# Patient Record
Sex: FEMALE | Race: WHITE | Employment: FULL TIME | URBAN - METROPOLITAN AREA
[De-identification: names, ages, dates, MRNs, and addresses within clinical notes are randomized per-mention and may not be internally consistent; named-entity substitution may affect disease eponyms.]

---

## 2018-06-27 PROBLEM — Z97.5 IUD (INTRAUTERINE DEVICE) IN PLACE: Status: ACTIVE | Noted: 2018-06-27

## 2018-06-27 PROBLEM — R87.612 LOW GRADE SQUAMOUS INTRAEPITHELIAL LESION ON CYTOLOGIC SMEAR OF CERVIX (LGSIL): Status: ACTIVE | Noted: 2018-06-27

## 2021-04-01 LAB
HBSAG, EXTERNAL: NEGATIVE
HEPATITIS C AB,   EXT: NEGATIVE
HIV, EXTERNAL: NONREACTIVE
RPR, EXTERNAL: NONREACTIVE
RUBELLA, EXTERNAL: NORMAL

## 2021-04-29 LAB
CHLAMYDIA, EXTERNAL: NEGATIVE
N. GONORRHEA, EXTERNAL: NEGATIVE

## 2021-10-15 LAB — GRBS, EXTERNAL: NEGATIVE

## 2021-11-03 ENCOUNTER — HOSPITAL ENCOUNTER (INPATIENT)
Age: 31
LOS: 4 days | Discharge: HOME | End: 2021-11-07
Attending: OBSTETRICS & GYNECOLOGY | Admitting: OBSTETRICS & GYNECOLOGY
Payer: COMMERCIAL

## 2021-11-03 PROBLEM — Z37.9 NORMAL LABOR: Status: ACTIVE | Noted: 2021-11-03

## 2021-11-03 LAB
ABO + RH BLD: NORMAL
BLOOD GROUP ANTIBODIES SERPL: NORMAL
ERYTHROCYTE [DISTWIDTH] IN BLOOD BY AUTOMATED COUNT: 13.6 % (ref 11.9–14.6)
HCT VFR BLD AUTO: 35.4 % (ref 35.8–46.3)
HGB BLD-MCNC: 12 G/DL (ref 11.7–15.4)
MCH RBC QN AUTO: 29.1 PG (ref 26.1–32.9)
MCHC RBC AUTO-ENTMCNC: 33.9 G/DL (ref 31.4–35)
MCV RBC AUTO: 85.7 FL (ref 79.6–97.8)
NRBC # BLD: 0 K/UL (ref 0–0.2)
PLATELET # BLD AUTO: 217 K/UL (ref 150–450)
PMV BLD AUTO: 9.8 FL (ref 9.4–12.3)
RBC # BLD AUTO: 4.13 M/UL (ref 4.05–5.2)
SPECIMEN EXP DATE BLD: NORMAL
WBC # BLD AUTO: 10 K/UL (ref 4.3–11.1)

## 2021-11-03 PROCEDURE — 74011250637 HC RX REV CODE- 250/637: Performed by: OBSTETRICS & GYNECOLOGY

## 2021-11-03 PROCEDURE — 36415 COLL VENOUS BLD VENIPUNCTURE: CPT

## 2021-11-03 PROCEDURE — 65270000029 HC RM PRIVATE

## 2021-11-03 PROCEDURE — 85027 COMPLETE CBC AUTOMATED: CPT

## 2021-11-03 PROCEDURE — 86850 RBC ANTIBODY SCREEN: CPT

## 2021-11-03 RX ORDER — SODIUM CHLORIDE 0.9 % (FLUSH) 0.9 %
5-40 SYRINGE (ML) INJECTION AS NEEDED
Status: DISCONTINUED | OUTPATIENT
Start: 2021-11-03 | End: 2021-11-05

## 2021-11-03 RX ORDER — BUTORPHANOL TARTRATE 2 MG/ML
1 INJECTION INTRAMUSCULAR; INTRAVENOUS
Status: DISCONTINUED | OUTPATIENT
Start: 2021-11-03 | End: 2021-11-05 | Stop reason: ALTCHOICE

## 2021-11-03 RX ORDER — OXYTOCIN/RINGER'S LACTATE 30/500 ML
0-20 PLASTIC BAG, INJECTION (ML) INTRAVENOUS
Status: DISCONTINUED | OUTPATIENT
Start: 2021-11-04 | End: 2021-11-04

## 2021-11-03 RX ORDER — MINERAL OIL
120 OIL (ML) ORAL
Status: DISPENSED | OUTPATIENT
Start: 2021-11-03 | End: 2021-11-04

## 2021-11-03 RX ORDER — LIDOCAINE HYDROCHLORIDE 10 MG/ML
1 INJECTION INFILTRATION; PERINEURAL
Status: ACTIVE | OUTPATIENT
Start: 2021-11-03 | End: 2021-11-04

## 2021-11-03 RX ORDER — OXYTOCIN/RINGER'S LACTATE 30/500 ML
87.3 PLASTIC BAG, INJECTION (ML) INTRAVENOUS AS NEEDED
Status: COMPLETED | OUTPATIENT
Start: 2021-11-04 | End: 2021-11-04

## 2021-11-03 RX ORDER — SODIUM CHLORIDE 0.9 % (FLUSH) 0.9 %
5-40 SYRINGE (ML) INJECTION EVERY 8 HOURS
Status: DISCONTINUED | OUTPATIENT
Start: 2021-11-03 | End: 2021-11-05

## 2021-11-03 RX ORDER — VITAMIN A ACETATE, BETA CAROTENE, ASCORBIC ACID, CHOLECALCIFEROL, .ALPHA.-TOCOPHEROL ACETATE, DL-, THIAMINE MONONITRATE, RIBOFLAVIN, NIACINAMIDE, PYRIDOXINE HYDROCHLORIDE, FOLIC ACID, CYANOCOBALAMIN, CALCIUM CARBONATE, FERROUS FUMARATE, ZINC OXIDE, CUPRIC OXIDE 3080; 12; 120; 400; 1; 1.84; 3; 20; 22; 920; 25; 200; 27; 10; 2 [IU]/1; UG/1; MG/1; [IU]/1; MG/1; MG/1; MG/1; MG/1; MG/1; [IU]/1; MG/1; MG/1; MG/1; MG/1; MG/1
1 TABLET, FILM COATED ORAL DAILY
COMMUNITY

## 2021-11-03 RX ORDER — DEXTROSE, SODIUM CHLORIDE, SODIUM LACTATE, POTASSIUM CHLORIDE, AND CALCIUM CHLORIDE 5; .6; .31; .03; .02 G/100ML; G/100ML; G/100ML; G/100ML; G/100ML
125 INJECTION, SOLUTION INTRAVENOUS CONTINUOUS
Status: DISCONTINUED | OUTPATIENT
Start: 2021-11-03 | End: 2021-11-05

## 2021-11-03 RX ORDER — OXYTOCIN/RINGER'S LACTATE 30/500 ML
10 PLASTIC BAG, INJECTION (ML) INTRAVENOUS AS NEEDED
Status: COMPLETED | OUTPATIENT
Start: 2021-11-04 | End: 2021-11-04

## 2021-11-03 RX ORDER — LIDOCAINE HYDROCHLORIDE 20 MG/ML
JELLY TOPICAL
Status: ACTIVE | OUTPATIENT
Start: 2021-11-03 | End: 2021-11-04

## 2021-11-03 RX ADMIN — MISOPROSTOL 50 MCG: 100 TABLET ORAL at 23:05

## 2021-11-03 RX ADMIN — MISOPROSTOL 50 MCG: 100 TABLET ORAL at 19:12

## 2021-11-03 NOTE — PROGRESS NOTES
Admit to room 430 for cytotec induction for HCW.  EFM and toco applied. Orders placed and report to Lizzeth Thompson RN.

## 2021-11-04 ENCOUNTER — ANESTHESIA EVENT (OUTPATIENT)
Dept: LABOR AND DELIVERY | Age: 31
End: 2021-11-04
Payer: COMMERCIAL

## 2021-11-04 ENCOUNTER — ANESTHESIA (OUTPATIENT)
Dept: LABOR AND DELIVERY | Age: 31
End: 2021-11-04
Payer: COMMERCIAL

## 2021-11-04 PROBLEM — Z34.90 ENCOUNTER FOR INDUCTION OF LABOR: Status: ACTIVE | Noted: 2021-11-03

## 2021-11-04 PROBLEM — Z3A.39 39 WEEKS GESTATION OF PREGNANCY: Status: ACTIVE | Noted: 2021-11-04

## 2021-11-04 LAB
BASE DEFICIT BLD-SCNC: 4.6 MMOL/L
BASE DEFICIT BLD-SCNC: 6.4 MMOL/L
HCO3 BLD-SCNC: 20.5 MMOL/L (ref 22–26)
HCO3 BLDV-SCNC: 20.5 MMOL/L (ref 23–28)
PCO2 BLDCO: 37 MMHG (ref 32–68)
PCO2 BLDCO: 45 MMHG (ref 32–68)
PH BLDCO: 7.27 [PH] (ref 7.15–7.38)
PH BLDCO: 7.35 [PH] (ref 7.15–7.38)
PO2 BLDCO: 20 MMHG
PO2 BLDCO: 21 MMHG
SAO2 % BLD: 27.6 % (ref 95–98)
SAO2 % BLDV: 29.2 % (ref 65–88)
SERVICE CMNT-IMP: ABNORMAL
SERVICE CMNT-IMP: ABNORMAL
SPECIMEN TYPE: ABNORMAL
SPECIMEN TYPE: ABNORMAL

## 2021-11-04 PROCEDURE — A4300 CATH IMPL VASC ACCESS PORTAL: HCPCS | Performed by: ANESTHESIOLOGY

## 2021-11-04 PROCEDURE — 75410000002 HC LABOR FEE PER 1 HR: Performed by: OBSTETRICS & GYNECOLOGY

## 2021-11-04 PROCEDURE — 74011250636 HC RX REV CODE- 250/636: Performed by: OBSTETRICS & GYNECOLOGY

## 2021-11-04 PROCEDURE — 74011250637 HC RX REV CODE- 250/637: Performed by: OBSTETRICS & GYNECOLOGY

## 2021-11-04 PROCEDURE — 94660 CPAP INITIATION&MGMT: CPT

## 2021-11-04 PROCEDURE — 75410000003 HC RECOV DEL/VAG/CSECN EA 0.5 HR: Performed by: OBSTETRICS & GYNECOLOGY

## 2021-11-04 PROCEDURE — 94760 N-INVAS EAR/PLS OXIMETRY 1: CPT

## 2021-11-04 PROCEDURE — 74011000250 HC RX REV CODE- 250: Performed by: ANESTHESIOLOGY

## 2021-11-04 PROCEDURE — 82803 BLOOD GASES ANY COMBINATION: CPT

## 2021-11-04 PROCEDURE — 75410000000 HC DELIVERY VAGINAL/SINGLE: Performed by: OBSTETRICS & GYNECOLOGY

## 2021-11-04 PROCEDURE — 77030014125 HC TY EPDRL BBMI -B: Performed by: ANESTHESIOLOGY

## 2021-11-04 PROCEDURE — 0UQMXZZ REPAIR VULVA, EXTERNAL APPROACH: ICD-10-PCS | Performed by: OBSTETRICS & GYNECOLOGY

## 2021-11-04 PROCEDURE — 74011000258 HC RX REV CODE- 258: Performed by: OBSTETRICS & GYNECOLOGY

## 2021-11-04 PROCEDURE — 65270000029 HC RM PRIVATE

## 2021-11-04 PROCEDURE — 77030019905 HC CATH URETH INTMIT MDII -A

## 2021-11-04 PROCEDURE — 74011250636 HC RX REV CODE- 250/636: Performed by: STUDENT IN AN ORGANIZED HEALTH CARE EDUCATION/TRAINING PROGRAM

## 2021-11-04 PROCEDURE — 10907ZC DRAINAGE OF AMNIOTIC FLUID, THERAPEUTIC FROM PRODUCTS OF CONCEPTION, VIA NATURAL OR ARTIFICIAL OPENING: ICD-10-PCS | Performed by: OBSTETRICS & GYNECOLOGY

## 2021-11-04 PROCEDURE — 76060000078 HC EPIDURAL ANESTHESIA: Performed by: OBSTETRICS & GYNECOLOGY

## 2021-11-04 RX ORDER — ROPIVACAINE HYDROCHLORIDE 2 MG/ML
INJECTION, SOLUTION EPIDURAL; INFILTRATION; PERINEURAL
Status: DISCONTINUED | OUTPATIENT
Start: 2021-11-04 | End: 2021-11-04 | Stop reason: HOSPADM

## 2021-11-04 RX ORDER — MISOPROSTOL 200 UG/1
TABLET ORAL
Status: ACTIVE
Start: 2021-11-04 | End: 2021-11-05

## 2021-11-04 RX ORDER — ONDANSETRON 2 MG/ML
4 INJECTION INTRAMUSCULAR; INTRAVENOUS
Status: DISCONTINUED | OUTPATIENT
Start: 2021-11-04 | End: 2021-11-05

## 2021-11-04 RX ORDER — ROPIVACAINE HYDROCHLORIDE 5 MG/ML
INJECTION, SOLUTION EPIDURAL; INFILTRATION; PERINEURAL AS NEEDED
Status: DISCONTINUED | OUTPATIENT
Start: 2021-11-04 | End: 2021-11-04 | Stop reason: HOSPADM

## 2021-11-04 RX ORDER — MISOPROSTOL 200 UG/1
1000 TABLET ORAL
Status: COMPLETED | OUTPATIENT
Start: 2021-11-04 | End: 2021-11-04

## 2021-11-04 RX ORDER — LIDOCAINE HYDROCHLORIDE AND EPINEPHRINE 15; 5 MG/ML; UG/ML
INJECTION, SOLUTION EPIDURAL
Status: COMPLETED | OUTPATIENT
Start: 2021-11-04 | End: 2021-11-04

## 2021-11-04 RX ORDER — ACETAMINOPHEN 500 MG
1000 TABLET ORAL
Status: DISCONTINUED | OUTPATIENT
Start: 2021-11-04 | End: 2021-11-05 | Stop reason: SDUPTHER

## 2021-11-04 RX ORDER — OXYTOCIN/RINGER'S LACTATE 30/500 ML
0-20 PLASTIC BAG, INJECTION (ML) INTRAVENOUS
Status: DISCONTINUED | OUTPATIENT
Start: 2021-11-04 | End: 2021-11-05

## 2021-11-04 RX ADMIN — SODIUM CHLORIDE, SODIUM LACTATE, POTASSIUM CHLORIDE, CALCIUM CHLORIDE, AND DEXTROSE MONOHYDRATE 125 ML/HR: 600; 310; 30; 20; 5 INJECTION, SOLUTION INTRAVENOUS at 15:48

## 2021-11-04 RX ADMIN — ROPIVACAINE HYDROCHLORIDE 10 ML/HR: 2 INJECTION, SOLUTION EPIDURAL; INFILTRATION at 10:58

## 2021-11-04 RX ADMIN — VANCOMYCIN HYDROCHLORIDE 1000 MG: 1 INJECTION, POWDER, LYOPHILIZED, FOR SOLUTION INTRAVENOUS at 20:58

## 2021-11-04 RX ADMIN — Medication 10000 MILLI-UNITS: at 21:15

## 2021-11-04 RX ADMIN — SODIUM CHLORIDE, SODIUM LACTATE, POTASSIUM CHLORIDE, CALCIUM CHLORIDE, AND DEXTROSE MONOHYDRATE 125 ML/HR: 600; 310; 30; 20; 5 INJECTION, SOLUTION INTRAVENOUS at 07:39

## 2021-11-04 RX ADMIN — ACETAMINOPHEN 1000 MG: 500 TABLET, FILM COATED ORAL at 20:58

## 2021-11-04 RX ADMIN — GENTAMICIN SULFATE 400 MG: 40 INJECTION, SOLUTION INTRAMUSCULAR; INTRAVENOUS at 22:43

## 2021-11-04 RX ADMIN — ROPIVACAINE HYDROCHLORIDE 7 MG: 5 INJECTION, SOLUTION EPIDURAL; INFILTRATION; PERINEURAL at 10:56

## 2021-11-04 RX ADMIN — Medication 87.3 MILLI-UNITS/MIN: at 21:26

## 2021-11-04 RX ADMIN — MISOPROSTOL 1000 MCG: 200 TABLET ORAL at 21:34

## 2021-11-04 RX ADMIN — LIDOCAINE HYDROCHLORIDE,EPINEPHRINE BITARTRATE 5 ML: 15; .005 INJECTION, SOLUTION EPIDURAL; INFILTRATION; INTRACAUDAL; PERINEURAL at 10:47

## 2021-11-04 RX ADMIN — Medication 1 MILLI-UNITS/MIN: at 07:39

## 2021-11-04 RX ADMIN — ONDANSETRON 4 MG: 2 INJECTION INTRAMUSCULAR; INTRAVENOUS at 16:04

## 2021-11-04 RX ADMIN — Medication 6 MILLI-UNITS/MIN: at 11:14

## 2021-11-04 RX ADMIN — ROPIVACAINE HYDROCHLORIDE 10 ML/HR: 2 INJECTION, SOLUTION EPIDURAL; INFILTRATION at 17:30

## 2021-11-04 RX ADMIN — MISOPROSTOL 50 MCG: 100 TABLET ORAL at 03:14

## 2021-11-04 NOTE — PROGRESS NOTES
Labor Progress Note:     S: Ms. Olivia Garcia is admitted with pregnancy at 39w0d for induction of labor. Prenatal course was normal.     Patient is currently comfortable with epidural in place. Patient reports no concerns. O: Patient Vitals for the past 12 hrs:   Pulse BP   21 1206 90 109/63   21 1202 66 116/64   21 1156 67 117/68   21 1152 64 119/70   21 1147 60 117/67   21 1142 63 115/63   21 1109 63 (!) 99/54   21 1108 74 (!) 104/55   21 1107 75 122/66   21 1105 65 (!) 104/59   21 1104 71 112/62   21 1103 61 111/63   21 1101 68 108/60   21 1100 68 (!) 106/57   21 1059 68 (!) 104/58   21 1058 65 106/66   21 1055 71 127/76   21 1053 61 130/64   21 1044 77 (!) 145/83   21 1013 (!) 121 (!) 137/91   21 0943 70 135/67   21 0913 (!) 56 115/66   21 0814 65 127/77   21 0745 75 122/76   21 0316 73 (!) 107/58      Physical Exam:  Patient without distress. Respirations even, nonlabored  Abdomen/Fundus: Gravid, relaxation between contraction, soft and non tender  Lower Extremities:  - Edema No  Cervical Exam: 2.5\75/-2  Membranes:  Artificial Rupture of Membranes; Amniotic Fluid: medium amount of clear fluid (@8AM)    Fetal Heart Rate: Reactive, Cat 1                                A/P:    Continue induction of labor with pitocin. S/p AROM. GBS neg. FHT reassuring. Anticipate .     Johnnie Olivarez MD

## 2021-11-04 NOTE — PROGRESS NOTES
Labor Progress Note:     S: Ms. Launie Klinefelter is admitted with pregnancy at 39w0d for induction of labor. Prenatal course was normal.     Patient is currently comfortable with epidural in place. Patient reports no concerns. O: Patient Vitals for the past 12 hrs:   Temp Pulse Resp BP   11/04/21 1645  65 18 130/64   11/04/21 1630  (!) 109  139/85   11/04/21 1615    129/74   11/04/21 1614 98.2 °F (36.8 °C) 68 18 129/74   11/04/21 1600  (!) 59 16 121/65   11/04/21 1544  81  138/79   11/04/21 1529  (!) 57  130/76   11/04/21 1514  (!) 59 16 119/76   11/04/21 1504  60  119/72   11/04/21 1459  (!) 52  120/75   11/04/21 1445  (!) 55  127/73   11/04/21 1430  (!) 55  125/70   11/04/21 1400  (!) 56  118/74   11/04/21 1344  62  121/72   11/04/21 1329  (!) 56  121/71   11/04/21 1315  (!) 56  120/73   11/04/21 1229  (!) 55  113/67   11/04/21 1211  (!) 54  110/66   11/04/21 1206  90  109/63   11/04/21 1202  66  116/64   11/04/21 1156  67  117/68   11/04/21 1152  64  119/70   11/04/21 1147  60  117/67   11/04/21 1142  63  115/63   11/04/21 1109  63  (!) 99/54   11/04/21 1108  74  (!) 104/55   11/04/21 1107  75  122/66   11/04/21 1105  65  (!) 104/59   11/04/21 1104  71  112/62   11/04/21 1103  61  111/63   11/04/21 1101  68  108/60   11/04/21 1100  68  (!) 106/57   11/04/21 1059  68  (!) 104/58   11/04/21 1058  65  106/66   11/04/21 1055  71  127/76   11/04/21 1053  61  130/64   11/04/21 1044  77  (!) 145/83   11/04/21 1013  (!) 121  (!) 137/91   11/04/21 0943  70  135/67   11/04/21 0913  (!) 56  115/66   11/04/21 0814  65  127/77   11/04/21 0745  75  122/76      Physical Exam:  Patient without distress. Respirations even, nonlabored  Abdomen/Fundus: Gravid, relaxation between contraction, soft and non tender  Lower Extremities:  - Edema No  Cervical Exam: 5-6\90/-1  Membranes:  Artificial Rupture of Membranes;  Amniotic Fluid: medium amount of clear fluid (8AM)    Fetal Heart Rate: Baseline: 160 per minute  Variability: moderate  Accelerations: yes  Decelerations: variable  Uterine contractions: regular, every 2 minutes          A/P:    Continue induction of labor with pitocin. S/p AROM at 8AM. Making good progress, now in active labor. FHT Cat 2, slight tachy and small variables. Overall reassuring. No maternal signs of infection, will monitor closely for signs of chorio. GBS negative. Anticipate .     Radha Serrano MD

## 2021-11-04 NOTE — PROGRESS NOTES
Lynn Patel at bedside at [phone]. FLASH Heller at bedside at 1045    Assisted pt to sitting up on bedside at 1043. Timeout completed at [phone] with FLASH BRYAN and myself at bedside. Test dose given at 1052. Negative reaction. Dose given at 1057. Pt assisted to lying back in left tilt position. See anesthesia record for details. See vital sign flow sheet for BP. Tolerated procedure well.

## 2021-11-04 NOTE — ANESTHESIA PROCEDURE NOTES
Epidural Block    Patient location during procedure: OB  Start time: 11/4/2021 10:47 AM  End time: 11/4/2021 10:56 AM  Reason for block: labor epidural  Staffing  Performed: attending   Anesthesiologist: Dorena Canavan, MD  Preanesthetic Checklist  Completed: patient identified, risks and benefits discussed, surgical consent, pre-op evaluation and timeout performed  Block Placement  Patient position: sitting  Prep: ChloraPrep  Sterility prep: cap, drape, gloves, hand and mask  Sedation level: no sedation  Patient monitoring: continuous pulse oximetry and heart rate  Approach: midline  Location: lumbar  Lumbar location: L3-L4  Epidural  Loss of resistance technique: air  Guidance: landmark technique  Needle  Needle type: Tuohy   Needle gauge: 17 G  Needle length: 10 cm  Needle insertion depth: 6 cm  Catheter type: end hole  Catheter size: 19 G  Catheter at skin depth: 11 cm  Catheter securement method: clear occlusive dressing, liquid medical adhesive and surgical tape  Test dose: negative (@1051)  Medications Administered  Lidocaine-EPINEPHrine (XYLOCAINE) 1.5 %-1:200,000 Epidural, 5 mL  Assessment  Number of attempts: 1  Procedure assessment: patient tolerated procedure well with no immediate complications

## 2021-11-04 NOTE — PROGRESS NOTES
Labor Progress Note:     S: Ms. Isai Guevara is admitted with pregnancy at 39w0d for induction of labor. Prenatal course was normal.     Patient is starting to feel some contractions. Patient reports no concerns. O: Patient Vitals for the past 12 hrs:   Pulse BP   21 0316 73 (!) 107/58      Physical Exam:  Patient without distress. Respirations even, nonlabored  Abdomen/Fundus: Gravid, relaxation between contraction, soft and non tender  Lower Extremities:  - Edema No  Cervical Exam: 1.5\75/-1 to -2  Membranes:  Artificial Rupture of Membranes; Amniotic Fluid: medium amount of clear fluid (8AM)    Fetal Heart Rate: Reactive, Cat 1          A/P:    Continue induction of labor with pitocin. AROM performed at 8AM, well tolerated, fluid clear. GBS negative. FHT reassuring. Anticipate .     Matias Sánchez MD

## 2021-11-04 NOTE — H&P
OB History & Physical    Name: John Montanez MRN: [id number]  SSN: [id number]    YOB: 1990  Age: 27 y.o. Sex: female      Subjective:     Estimated Date of Delivery: None noted. OB History    Para Term  AB Living   1 0 0 0 0 0   SAB IAB Ectopic Molar Multiple Live Births   0 0 0   0        # Outcome Date GA Lbr Lane/2nd Weight Sex Delivery Anes PTL Lv   1 Current                Ms. Misa Main is admitted with pregnancy at 39w0d for induction of labor due to term pregnancy. Prenatal course was normal.  Please see prenatal records for details. GBS negative. Today, patient reports active fetal movement, and no vaginal bleeding, leakage of fluid, or uterine contractions. She was admitted last night for cervical ripening. She received 3 doses of cytotec. Now, she is on pitocin at 1. Past Medical History:   Diagnosis Date    Abnormal Papanicolaou smear of cervix     HPV but resolved after 6 months    Asthma     Depression     FH: breast cancer in first degree relative     FH: ovarian cancer in first degree relative     HPV in female     Low grade squamous intraepithelial lesion (LGSIL) on Papanicolaou smear of cervix     Trauma      Past Surgical History:   Procedure Laterality Date    HX WISDOM TEETH EXTRACTION Left      Social History     Occupational History    Not on file   Tobacco Use    Smoking status: Former Smoker     Quit date: 2012     Years since quittin.3    Smokeless tobacco: Never Used   Substance and Sexual Activity    Alcohol use: Yes     Alcohol/week: 0.0 standard drinks     Comment: all 3 and 14 per week    Drug use: No    Sexual activity: Yes     Partners: Male     Birth control/protection: I.U.D.      Family History   Problem Relation Age of Onset    Cancer Mother 39        cervical (pt thought it was ovarian)breast    Breast Cancer Mother 62    Alcohol abuse Mother     Arthritis-osteo Mother     Lung Disease Mother     Alcohol abuse Father     Elevated Lipids Father     Hypertension Father     Diabetes Maternal Grandfather     Cancer Maternal Grandfather         prostate       Allergies   Allergen Reactions    Pcn [Penicillins] Rash     Prior to Admission medications    Medication Sig Start Date End Date Taking? Authorizing Provider   prenatal vit-calcium-iron-fa (Prenatal Plus, calcium carb,) 27 mg iron- 1 mg tab Take 1 Tablet by mouth daily. Yes Provider, Historical   ISOtretinoin (ACCUTANE) 40 mg capsule Take  by mouth. Patient not taking: Reported on 11/3/2021    Provider, Historical   levonorgestrel (MIRENA) 20 mcg/24 hr (5 years) IUD 1 Each by IntraUTERine route once. Patient not taking: Reported on 11/3/2021    Provider, Historical        Review of Systems: A comprehensive review of systems was negative except for that written in the History of Present Illness. Objective:     Vitals:  Vitals:    21 1904 21 0316   BP: 121/65 (!) 107/58   Pulse: 75 73   Resp: 16    Temp: 98.1 °F (36.7 °C)    SpO2: 99%         Physical Exam:  Patient without distress. Heart: Regular rate and rhythm  Lung: clear to auscultation throughout lung fields, no wheezes, no rales, no rhonchi and normal respiratory effort  Back: costovertebral angle tenderness absent  Abdomen: soft, nontender  Fundus: soft and non tender  Perineum: blood absent, amniotic fluid absent  Cervical Exam: 1.5\75/-2  Lower Extremities:  - Edema No  Membranes:  Intact    Fetal Heart Rate: Reactive, Cat 1          Prenatal Labs:   Lab Results   Component Value Date/Time    ABO/Rh(D) O POSITIVE 2021 07:02 PM       Impression/Plan:     Principal Problem:    Encounter for induction of labor (11/3/2021)    Active Problems:    39 weeks gestation of pregnancy (2021)         Plan: Admit for induction of labor. Received cytotec last night for ripening. Plan for pitocin now with AROM as able. FHT Cat 1. Group B Strep negative. Anticipate .     Ernie Vega MD

## 2021-11-04 NOTE — PROGRESS NOTES
Straight cath for 300 ml of clear yellow urine, SVE 5-6/90/-1. Dr. Joellen Tyler at [facility], strip reviewed.

## 2021-11-04 NOTE — ANESTHESIA PREPROCEDURE EVALUATION
Relevant Problems   No relevant active problems       Anesthetic History   No history of anesthetic complications            Review of Systems / Medical History  Patient summary reviewed, nursing notes reviewed and pertinent labs reviewed    Pulmonary            Asthma : well controlled       Neuro/Psych         Psychiatric history     Cardiovascular  Within defined limits                Exercise tolerance: >4 METS     GI/Hepatic/Renal  Within defined limits              Endo/Other  Within defined limits           Other Findings              Physical Exam    Airway  Mallampati: II      Mouth opening: Normal     Cardiovascular  Regular rate and rhythm,  S1 and S2 normal,  no murmur, click, rub, or gallop             Dental  No notable dental hx       Pulmonary  Breath sounds clear to auscultation               Abdominal         Other Findings            Anesthetic Plan    ASA: 2  Anesthesia type: epidural          Induction: Intravenous  Anesthetic plan and risks discussed with: Patient and Spouse

## 2021-11-05 LAB
HCT VFR BLD AUTO: 35.2 % (ref 35.8–46.3)
HGB BLD-MCNC: 12 G/DL (ref 11.7–15.4)

## 2021-11-05 PROCEDURE — 74011250637 HC RX REV CODE- 250/637: Performed by: OBSTETRICS & GYNECOLOGY

## 2021-11-05 PROCEDURE — 36415 COLL VENOUS BLD VENIPUNCTURE: CPT

## 2021-11-05 PROCEDURE — 85018 HEMOGLOBIN: CPT

## 2021-11-05 PROCEDURE — 65270000029 HC RM PRIVATE

## 2021-11-05 RX ORDER — DIPHENHYDRAMINE HCL 25 MG
25 CAPSULE ORAL
Status: DISCONTINUED | OUTPATIENT
Start: 2021-11-05 | End: 2021-11-07 | Stop reason: HOSPADM

## 2021-11-05 RX ORDER — SIMETHICONE 80 MG
80 TABLET,CHEWABLE ORAL
Status: DISCONTINUED | OUTPATIENT
Start: 2021-11-05 | End: 2021-11-07 | Stop reason: HOSPADM

## 2021-11-05 RX ORDER — ACETAMINOPHEN 325 MG/1
650 TABLET ORAL
Status: DISCONTINUED | OUTPATIENT
Start: 2021-11-05 | End: 2021-11-07 | Stop reason: HOSPADM

## 2021-11-05 RX ORDER — IBUPROFEN 600 MG/1
600 TABLET ORAL EVERY 6 HOURS
Status: DISCONTINUED | OUTPATIENT
Start: 2021-11-05 | End: 2021-11-07 | Stop reason: HOSPADM

## 2021-11-05 RX ORDER — ONDANSETRON 4 MG/1
4 TABLET, ORALLY DISINTEGRATING ORAL
Status: ACTIVE | OUTPATIENT
Start: 2021-11-05 | End: 2021-11-06

## 2021-11-05 RX ORDER — OXYCODONE HYDROCHLORIDE 5 MG/1
5 TABLET ORAL
Status: DISCONTINUED | OUTPATIENT
Start: 2021-11-05 | End: 2021-11-07 | Stop reason: HOSPADM

## 2021-11-05 RX ORDER — PRENATAL VIT 96/IRON FUM/FOLIC 27MG-0.8MG
1 TABLET ORAL DAILY
Status: DISCONTINUED | OUTPATIENT
Start: 2021-11-05 | End: 2021-11-07 | Stop reason: HOSPADM

## 2021-11-05 RX ORDER — DOCUSATE SODIUM 100 MG/1
100 CAPSULE, LIQUID FILLED ORAL 2 TIMES DAILY
Status: DISCONTINUED | OUTPATIENT
Start: 2021-11-05 | End: 2021-11-07 | Stop reason: HOSPADM

## 2021-11-05 RX ADMIN — DOCUSATE SODIUM 100 MG: 100 CAPSULE ORAL at 19:22

## 2021-11-05 RX ADMIN — IBUPROFEN 600 MG: 600 TABLET, FILM COATED ORAL at 01:10

## 2021-11-05 RX ADMIN — IBUPROFEN 600 MG: 600 TABLET, FILM COATED ORAL at 06:45

## 2021-11-05 RX ADMIN — IBUPROFEN 600 MG: 600 TABLET, FILM COATED ORAL at 19:22

## 2021-11-05 RX ADMIN — PRENATAL VIT W/ FE FUMARATE-FA TAB 27-0.8 MG 1 TABLET: 27-0.8 TAB at 19:22

## 2021-11-05 RX ADMIN — WITCH HAZEL 1 PAD: 500 SOLUTION RECTAL; TOPICAL at 22:34

## 2021-11-05 RX ADMIN — IBUPROFEN 600 MG: 600 TABLET, FILM COATED ORAL at 12:59

## 2021-11-05 NOTE — PROGRESS NOTES
Labor Progress Note:     S: Ms. Odell Mcclellan is admitted with pregnancy at 39w0d for induction of labor. Prenatal course was normal.     Patient is currently comfortable with epidural in place. Patient reports no concerns. I was called due to development of fetal tachycardia and Maternal Temp of 101. Patient also complete.     O:   Patient Vitals for the past 12 hrs:   Temp Pulse Resp BP   11/04/21 1849 99.6 °F (37.6 °C)  18    11/04/21 1844  85  132/69   11/04/21 1829  66  121/60   11/04/21 1815    122/64   11/04/21 1814  64     11/04/21 1800    119/65   11/04/21 1759  68     11/04/21 1745    (!) 113/59   11/04/21 1744  73     11/04/21 1730  67  (!) 132/58   11/04/21 1715    122/74   11/04/21 1645  65 18 130/64   11/04/21 1630  (!) 109  139/85   11/04/21 1615    129/74   11/04/21 1614 98.2 °F (36.8 °C) 68 18 129/74   11/04/21 1600  (!) 59 16 121/65   11/04/21 1544  81  138/79   11/04/21 1529  (!) 57  130/76   11/04/21 1514  (!) 59 16 119/76   11/04/21 1504  60  119/72   11/04/21 1459  (!) 52  120/75   11/04/21 1445  (!) 55  127/73   11/04/21 1430  (!) 55  125/70   11/04/21 1400  (!) 56  118/74   11/04/21 1344  62  121/72   11/04/21 1329  (!) 56  121/71   11/04/21 1315  (!) 56  120/73   11/04/21 1229  (!) 55  113/67   11/04/21 1211  (!) 54  110/66   11/04/21 1206  90  109/63   11/04/21 1202  66  116/64   11/04/21 1156  67  117/68   11/04/21 1152  64  119/70   11/04/21 1147  60  117/67   11/04/21 1142  63  115/63   11/04/21 1109  63  (!) 99/54   11/04/21 1108  74  (!) 104/55   11/04/21 1107  75  122/66   11/04/21 1105  65  (!) 104/59   11/04/21 1104  71  112/62   11/04/21 1103  61  111/63   11/04/21 1101  68  108/60   11/04/21 1100  68  (!) 106/57   11/04/21 1059  68  (!) 104/58   11/04/21 1058  65  106/66   11/04/21 1055  71  127/76   11/04/21 1053  61  130/64   11/04/21 1044  77  (!) 145/83   11/04/21 1013  (!) 121  (!) 137/91   21 0943  70  135/67   21 0913  (!) 56  115/66      Physical Exam:  Patient without distress. Respirations even, nonlabored  Abdomen/Fundus: Gravid, relaxation between contraction, soft and non tender  Lower Extremities:  - Edema No  Cervical Exam: 10/100/+1  Membranes:  Artificial Rupture of Membranes; Amniotic Fluid: medium amount of clear fluid (8AM)    Fetal Heart Rate: Baseline: 160 per minute  Variability: moderate  Accelerations: yes  Decelerations: variable  Uterine contractions: regular, every 2 minutes          A/P:    Complete, will start pushing. Meets diagnosis of Chrio. Allergy to PCN (rash). Will give [place]. GBS negative. FHT Cat 2 but overall reassuring given anticipate  soon. Will call peds to delivery due to chorio.     Vickie Miles MD

## 2021-11-05 NOTE — PROGRESS NOTES
Chart reviewed - first time parent; baby in NICU (respiratory distress). SW unsuccessfully attempted to meet with patient twice today (in bathroom, then asleep). SW will try again tomorrow.     SHANI Petty, [address] [facility]   [phone]

## 2021-11-05 NOTE — ANESTHESIA POSTPROCEDURE EVALUATION
* No procedures listed *.    epidural    Anesthesia Post Evaluation      Multimodal analgesia: multimodal analgesia used between 6 hours prior to anesthesia start to PACU discharge  Patient location during evaluation: bedside  Patient participation: complete - patient participated  Level of consciousness: awake and alert  Pain management: adequate  Airway patency: patent  Anesthetic complications: no  Cardiovascular status: acceptable  Respiratory status: nonlabored ventilation and acceptable  Hydration status: acceptable  Post anesthesia nausea and vomiting:  none      INITIAL Post-op Vital signs:   Vitals Value Taken Time   /69 11/04/21 2244   Temp     Pulse 60 11/04/21 2244   Resp     SpO2     Vitals shown include unvalidated device data.

## 2021-11-05 NOTE — L&D DELIVERY NOTE
Delivery Note    Obstetrician:  Vickie Miles MD    Pre-Delivery Diagnosis: Term pregnancy (39w0d), Induced labor and Chorioamnionitis     Post-Delivery Diagnosis: Same, plus live born MALE infant [place])    Intrapartum Event: Chorioamnionitis diagnosed about 1 hour before delivery, when patient was found to be complete. Antibiotics Vancomycin (PCN allergy) and Gentamycin were ordered. She received 1 dose of Vancomycin prior to delivery. Gentamycin was hung right after delivery. Procedure: Spontaneous vaginal delivery    Epidural: YES    Estimated Blood Loss: 150 cc    Episiotomy: None    Laceration(s):  Small left labial laceration, and right vaginal to base of right labia laceration. Laceration(s) repair: YES    Presentation: Cephalic    Fetal Position: Left Occiput Anterior    Birth Weight: 7lb 9.7oz    Apgar - 8 and 8    Umbilical Cord: Nuchal Cord x  1  Specimens: Cord blood and gases  Complications:  Chorioamnionitis     Delivery note:  Patient progressed to complete/complete/+1. Chorioamnionitis diagnosed about 1 hour before delivery, when patient was found to be complete. Antibiotics Vancomycin (PCN allergy) and Gentamycin were ordered. She received 1 dose of Vancomycin prior to delivery. Gentamycin was hung right after delivery. FHT Cat 2 with mild tachycardia of 160s and some variables. When complete, she pushed to deliver a male infant, position MORE . Head was delivered in a controlled manner. Nuchal cord was noted once around the head. It could not be reduced, so was delivered through. The shoulders and body delivered with usual maneuvers. The infant was placed on mother's chest for skin to skin contact. Cord was clamped and cut after a 30 second delay. The infant was handed off the the awaiting pediatric team. Pediatric team was present for Chorioamnionitis. The placenta delivered spontaneously and intact, described as above. Uterus was firm, lochia appropriate.  Due to chorio and risk of pp hemorrhage, prophylactic cytotec 1000mcg was placed rectally. Left labial and right vaginal to base of right labia laceration were repaired with 2-0 vicryl. Mom doing well. Baby taken to [facility] due to grunting and low oxygen saturations. Cord Blood Results:   Information for the patient's :  Sarika Gutierrez [[id number]]     Lab Results   Component Value Date/Time    ILIA IgG NEG 2021 09:09 PM    ABO/Rh(D) A NEGATIVE 2021 09:09 PM        Prenatal Labs:     Lab Results   Component Value Date/Time    ABO/Rh(D) Jarad Hernandez POSITIVE 2021 07:02 PM        Zamzam Patricio MD        Delivery Summary    Patient: Sebastian Tsai MRN: [id number]  SSN: [id number]    YOB: 1990  Age: 27 y.o. Sex: female       Information for the patient's :  Sarika Gutierrez [[id number]]       Labor Events:    Labor:      Steroids:     Cervical Ripening Date/Time:       Cervical Ripening Type:     Antibiotics During Labor:     Rupture Identifier:      Rupture Date/Time:       Rupture Type:     Amniotic Fluid Volume:      Amniotic Fluid Description:      Amniotic Fluid Odor:      Induction:         Induction Date/Time:        Indications for Induction:      Augmentation:     Augmentation Date/Time:      Indications for Augmentation:     Labor complications:          Additional complications:        Delivery Events:  Indications For Episiotomy:     Episiotomy:     Perineal Laceration(s):     Repaired:     Periurethral Laceration Location:      Repaired:     Labial Laceration Location: left   Repaired: Yes   Sulcal Laceration Location:     Repaired:     Vaginal Laceration Location: right   Repaired: Yes   Cervical Laceration Location:     Repaired:     Repair Suture: Vicryl 2-0   Number of Repair Packets: 1   Estimated Blood Loss (ml):  ml   Quantitative Blood Loss (ml)                Delivery Date: 2021    Delivery Time: 9:09 PM  Delivery Type: Vaginal, Spontaneous  Sex:  Male    Gestational Age: 39w0d   Delivery Clinician:  Shantell Ortiz  Living Status: Living   Delivery Location: L&D            APGARS  One minute Five minutes Ten minutes   Skin color:            Heart rate:            Grimace:            Muscle tone:            Breathing: Totals:                Presentation: Vertex    Position: Left Occiput Anterior  Resuscitation Method:  Suctioning-bulb; Tactile Stimulation     Meconium Stained:        Cord Information: 3 Vessels  Complications: Nuchal Cord Without Compressions  Cord around: head  Delayed cord clamping? Yes  Cord clamped date/time:   Disposition of Cord Blood: Lab    Blood Gases Sent?: Yes    Placenta:  Date/Time: 2021  9:15 PM  Removal: Spontaneous      Appearance: Normal     [place] Measurements:  Birth Weight: 3.45 kg      Birth Length: 53 cm      Head Circumference: 37.5 cm      Chest Circumference: 32.5 cm     Abdominal Girth:       Other Providers:   Oleg MCNALLY;JOSTIN ROSEN;NANCY NORTH;CINDY GARAY;PADDY ESCALANTE, Obstetrician;Primary Nurse;Neonatologist;Respiratory Therapist;Staff Nurse           Group B Strep: No results found for: LINDA Devine  Information for the patient's :  Gricelda Ray [[id number]]     Lab Results   Component Value Date/Time    ABO/Rh(D) A NEGATIVE 2021 09:09 PM    ILIA IgG NEG 2021 09:09 PM        Recent Labs     21   PCO2CB 45  37   PO2CB 21  20   HCO3I 20.5*   SO2I 27.6*   IBD 6.4  4.6   SPECTI ARTERIAL CORD  VENOUS CORD   PHICB 7.27  7.35

## 2021-11-05 NOTE — PROGRESS NOTES
Patient and  returned from [facility]. Patient up to bathroom, voided 600 ml without difficulty.

## 2021-11-05 NOTE — PROGRESS NOTES
Delivery Note    Dr Luisa Sanchez arrived to bedside at 2050. Pt positioned for delivery and set up at 2048. Spontaneous Vaginal delivery of viable Male infant @ 2109. Perineum with 1st degree and repaired. See delivery summary for details.

## 2021-11-05 NOTE — LACTATION NOTE
Assisted mom pumping for baby in NCU. Demonstrated use of Symphony pump and also hand expression. Assisted with colostrum retrieval from pump. Offered assistance in NCU once baby able to go to breast.  Got 2 ml. Provided labels for milk. Reviewed NCU packet. Reviewed need to pump 8 times in 24 hours. Proper storage for baby in NCU. Discussed NCU pump available for moms who are discharged before baby. Encouraged mom to pump at baby's bedside as well. Suggest skin to skin as often as able.   Plan to assist with feeding in NCU at [address] [place] [address],[address].

## 2021-11-05 NOTE — PROGRESS NOTES
Breast milk taken to SCN after patient pumped. Patient denies any further needs at this time. Call bell within reach of patient.

## 2021-11-05 NOTE — PROGRESS NOTES
Postpartum Progress Note (VD)    Patient: Pastor Eastman MRN: [id number]  SSN: [id number]    YOB: 1990  Age: 27 y.o. Sex: female      Subjective:     Postpartum Day: 1     Delivery: Spontaneous vaginal delivery    The patient feels well. The patient denies emotional concerns. Pain is  well controlled with current medications. Voiding spontaneous. The patient is ambulating well. The patient is tolerating a normal diet. Lochia is light. The baby is doing well but still in SCN. Baby is feeding via breast.    Objective:      Patient Vitals for the past 8 hrs:   BP Pulse   11/05/21 0421 (!) 113/56 (!) 59   11/05/21 0158 120/73 70     General:    alert, cooperative, no distress   Bowel Sounds:  active   Lochia:  appropriate   Uterine Fundus:   firm   Fundus Location:  -1   Perineum:  not examined   DVT Evaluation:  No evidence of DVT seen on physical exam.     Lab/Data Review: All lab results for the last 24 hours reviewed. Assessment:     Status post: Doing well postpartum vaginal delivery     Plan:     - Postpartum care discussed including diet, ambulation, and actvitiy restrictions. - Chorio: Single dose abx at time of delivery. None pp.  No signs of infection.  - Discharge planning for PPD #2      Hortencia Vanegas MD

## 2021-11-06 PROCEDURE — 74011250637 HC RX REV CODE- 250/637: Performed by: OBSTETRICS & GYNECOLOGY

## 2021-11-06 PROCEDURE — 2709999900 HC NON-CHARGEABLE SUPPLY

## 2021-11-06 PROCEDURE — 65270000029 HC RM PRIVATE

## 2021-11-06 RX ADMIN — IBUPROFEN 600 MG: 600 TABLET, FILM COATED ORAL at 01:12

## 2021-11-06 RX ADMIN — DOCUSATE SODIUM 100 MG: 100 CAPSULE ORAL at 16:18

## 2021-11-06 RX ADMIN — PRENATAL VIT W/ FE FUMARATE-FA TAB 27-0.8 MG 1 TABLET: 27-0.8 TAB at 08:53

## 2021-11-06 RX ADMIN — WITCH HAZEL 1 PAD: 500 SOLUTION RECTAL; TOPICAL at 08:53

## 2021-11-06 RX ADMIN — DOCUSATE SODIUM 100 MG: 100 CAPSULE ORAL at 08:53

## 2021-11-06 RX ADMIN — IBUPROFEN 600 MG: 600 TABLET, FILM COATED ORAL at 07:07

## 2021-11-06 RX ADMIN — IBUPROFEN 600 MG: 600 TABLET, FILM COATED ORAL at 16:18

## 2021-11-06 RX ADMIN — IBUPROFEN 600 MG: 600 TABLET, FILM COATED ORAL at 22:58

## 2021-11-06 RX ADMIN — SIMETHICONE 80 MG: 80 TABLET, CHEWABLE ORAL at 01:12

## 2021-11-06 NOTE — PROGRESS NOTES
Problem: Vaginal Delivery: Postpartum Day 1  Goal: Activity/Safety  Outcome: Resolved/Met  Goal: Consults, if ordered  Outcome: Resolved/Met  Goal: Diagnostic Test/Procedures  Outcome: Resolved/Met  Goal: Nutrition/Diet  Outcome: Resolved/Met  Goal: Discharge Planning  Outcome: Progressing Towards Goal  Goal: Medications  Outcome: Resolved/Met  Goal: Treatments/Interventions/Procedures  Outcome: Resolved/Met  Goal: Psychosocial  Outcome: Resolved/Met  Goal: *Vital signs within defined limits  Outcome: Resolved/Met  Goal: *Labs within defined limits  Outcome: Resolved/Met  Goal: *Hemodynamically stable  Outcome: Resolved/Met  Goal: *Optimal pain control at patient's stated goal  Outcome: Resolved/Met  Goal: *Participates in infant care  Outcome: Progressing Towards Goal  Goal: *Demonstrates progressive activity  Outcome: Resolved/Met  Goal: *Performs self perineal care  Outcome: Resolved/Met  Goal: *Appropriate parent-infant bonding  Outcome: Resolved/Met  Goal: *Tolerating diet  Outcome: Resolved/Met

## 2021-11-06 NOTE — PROGRESS NOTES
Morning assessment complete, see flowsheet. Patient changing infants diaper and going to breastfeed. Doing well, no pain at this time. Voiding well and small bleeding.   Denies any needs at this time

## 2021-11-06 NOTE — LACTATION NOTE
This note was copied from a baby's chart. In to follow up with mom and infant. Mom stated that it was time for infant to nurse. Assist with latch on the left breast in the cross cradle hold. Infant latched and started to suck rhythmically. Infant sleepy so mom had to \"remind\" infant to suck. After several minutes came off the breast content. Mom burped infant and placed him to her right breast in the football hold. Infant latched and started to suck rhythmically. Brad Omalley asleep at the breast. Mom then pumped and expressed 4 ml colostrum. Instructed mom on how to feed infant with curve tip syringe while infant sucked on her finger. Lactation consultant will follow up tomorrow.

## 2021-11-06 NOTE — PROGRESS NOTES
Consult to YAEL for EPDS of 10. YAEL DUNN attempted to meet with the patient twice yesterday but was unable to make contact. Patient is a first time mom with an elevated EPDS. YAEL called the patient, spoke with her spouse who advises that she's in the restroom. SW will present to the bedside shortly to provide education on PPD, resources, as well as a brochure on [facility] [address] program and [facility] support system. Update: YAEL met with the patient and her spouse. EPDS of 10, answered never to question 10. Patient advises that she has good support at home, states that she has experienced more anxiety pending her son's arrival and his stay in NICU which is understandable. SW provided support and education. Patient given PPD packet and [address] brochure, she states she may be interested in a [address] but wants to wait until she gets home to decide.      Jose Francisco Raines LMSW    [facility]    * [email or web address]

## 2021-11-06 NOTE — PROGRESS NOTES
SBAR report received from [address] of patient assumed    0707-Scheduled Motrin given. Patient sleeping upon entrance into room. States she will feed baby @ 0830.   Denies any needs at this time

## 2021-11-06 NOTE — PROGRESS NOTES
Shift assessment complete as noted. Patient denies needs. Questions encouraged and answered. Encouraged to call for needs or concerns. Verbalizes understanding. Plan of care reviewed and whiteboard updated.

## 2021-11-06 NOTE — PROGRESS NOTES
Postpartum Progress Note (VD)    Patient: Pastor Eastman MRN: [id number]  SSN: [id number]    YOB: 1990  Age: 27 y.o. Sex: female      Subjective:     Postpartum Day: 2     Delivery: Spontaneous vaginal delivery    The patient feels well. The patient denies emotional concerns. Pain is  well controlled with current medications. Voiding spontaneous. The patient is ambulating well. The patient is tolerating a normal diet. Lochia is light. The baby is doing well and out of SCN, but has lost weight and is likely going to need to stay until tomorrow. Baby is feeding via breast.    Objective:      Patient Vitals for the past 8 hrs:   BP Temp Pulse Resp SpO2   11/06/21 0709 (!) 109/57 97.6 °F (36.4 °C) 60 15 97 %     General:    alert, cooperative, no distress   Bowel Sounds:  active   Lochia:  appropriate   Uterine Fundus:   firm   Fundus Location:  -1   Perineum:  not examined   DVT Evaluation:  No evidence of DVT seen on physical exam.     Lab/Data Review: All lab results for the last 24 hours reviewed. Assessment:     Status post: Doing well postpartum vaginal delivery     Plan:     - Postpartum care discussed including diet, ambulation, and actvitiy restrictions. - Chorio: Single dose abx at time of delivery. None pp. No signs of infection. - Will monitor 1 more day, amber given need for further infant monitoring.  - Discharge planning for PPD #3  - Discharge instructions for vaginal delivery reviewed.       Hortencia Vanegas MD

## 2021-11-06 NOTE — PROGRESS NOTES
RN at bedside, Dr uPshpa Naidu assessing infant  Patient doing well, going to try and take a nap  Will call RN with any needs

## 2021-11-07 VITALS
BODY MASS INDEX: 31.4 KG/M2 | OXYGEN SATURATION: 99 % | HEART RATE: 60 BPM | TEMPERATURE: 98.5 F | RESPIRATION RATE: 17 BRPM | SYSTOLIC BLOOD PRESSURE: 119 MMHG | WEIGHT: 212 LBS | DIASTOLIC BLOOD PRESSURE: 67 MMHG | HEIGHT: 69 IN

## 2021-11-07 PROCEDURE — 2709999900 HC NON-CHARGEABLE SUPPLY

## 2021-11-07 PROCEDURE — 74011250637 HC RX REV CODE- 250/637: Performed by: OBSTETRICS & GYNECOLOGY

## 2021-11-07 RX ADMIN — WITCH HAZEL 1 PAD: 500 SOLUTION RECTAL; TOPICAL at 12:41

## 2021-11-07 RX ADMIN — IBUPROFEN 600 MG: 600 TABLET, FILM COATED ORAL at 10:23

## 2021-11-07 RX ADMIN — DOCUSATE SODIUM 100 MG: 100 CAPSULE ORAL at 10:23

## 2021-11-07 RX ADMIN — PRENATAL VIT W/ FE FUMARATE-FA TAB 27-0.8 MG 1 TABLET: 27-0.8 TAB at 10:23

## 2021-11-07 RX ADMIN — IBUPROFEN 600 MG: 600 TABLET, FILM COATED ORAL at 04:13

## 2021-11-07 NOTE — LACTATION NOTE
This note was copied from a baby's chart. In to follow up with mom and infant prior to discharge to home. Mom was pumping to express colostrum/breastmilk to feed to infant during circumcision. Reviewed discharge information and answered mom and dad's questions. Reviewed engorgement as well as pumping to empty if infant does not empty breast when nursing. Mom expressed a total of 21 ml. 9 ml given to PCT to feed to infant during procedure. The rest placed in container to take home. Mom to follow up with lactation consultant as needed.

## 2021-11-07 NOTE — DISCHARGE INSTRUCTIONS
Vaginal Childbirth: What To Expect At Home    Your Recovery: Your body will slowly heal in the next few weeks. It is easy to get too tired and overwhelmed during the first weeks after your baby is born. Changes in your hormones can shift your mood without warning. You may find it hard to meet the extra demands on your energy and time. Take it easy on yourself. Follow-up care is a key part of your treatment and safety. Be sure to make and go to all appointments, and call your doctor if you are having problems. It's also a good idea to know your test results and keep a list of the medicines you take. How can you care for yourself at home? Vaginal bleeding and cramps  · After delivery, you will have a bloody discharge from the vagina. This will turn pink within a week and then white or yellow after about 10 days. It may last for 2 to 4 weeks or longer, until the uterus has healed. Use pads instead of tampons until you stop bleeding. · Do not worry if you pass some blood clots, as long as they are smaller than a golf ball. If you have a tear or stitches in your vaginal area, change the pad at least every 4 hours to prevent soreness and infection. · You may have cramps for the first few days after childbirth. These are normal and occur as the uterus shrinks to normal size. Take an over-the-counter pain medicine, such as acetaminophen (Tylenol), ibuprofen (Advil, Motrin), or naproxen (Aleve), for cramps. Read and follow all instructions on the label. Do not take aspirin, because it can cause more bleeding. Do not take acetaminophen (Tylenol) and other acetaminophen containing medications (i.e. Percocet) at the same time. Breast fullness  · Your breasts may overfill (engorge) in the first few days after delivery. To help milk flow and to relieve pain, warm your breasts in the shower or by using warm, moist towels before nursing.   · If you are not nursing, do not put warmth on your breasts or touch your breasts. Wear a tight bra or sports bra and use ice until the fullness goes away. This usually takes 2 to 3 days. · Put ice or a cold pack on your breast after nursing to reduce swelling and pain. Put a thin cloth between the ice and your skin. Activity  · Eat a balanced diet. Do not try to lose weight by cutting calories. Keep taking your prenatal vitamins, or take a multivitamin. · Get as much rest as you can. Try to take naps when your baby sleeps during the day. · Get some exercise every day. But do not do any heavy exercise until your doctor says it is okay. · Wait until you are healed (about 4 to 6 weeks) before you have sexual intercourse. Your doctor will tell you when it is okay to have sex. · Talk to your doctor about birth control. You can get pregnant even before your period returns. Also, you can get pregnant while you are breast-feeding. Mental Health  · Many women get the \"baby blues\" during the first few days after childbirth. You may lose sleep, feel irritable, and cry easily. You may feel happy one minute and sad the next. Hormone changes are one cause of these emotional changes. Also, the demands of a new baby, along with visits from relatives or other family needs, add to a mother's stress. The \"baby blues\" often peak around the fourth day. Then they ease up in less than 2 weeks. · If your moodiness or anxiety lasts for more than 2 weeks, or if you feel like life is not worth living, you may have postpartum depression. This is different for each mother. Some mothers with serious depression may worry intensely about their infant's well-being. Others may feel distant from their child. Some mothers might even feel that they might harm their baby. A mother may have signs of paranoia, wondering if someone is watching her. · With all the changes in your life, you may not know if you are depressed.  Pregnancy sometimes causes changes in how you feel that are similar to the symptoms of depression. · Symptoms of depression include:  · Feeling sad or hopeless and losing interest in daily activities. These are the most common symptoms of depression. · Sleeping too much or not enough. · Feeling tired. You may feel as if you have no energy. · Eating too much or too little. · [employer] (PSI) offers a Warm line; Chat with the Expert phone sessions; Information and Articles about Pregnancy and Postpartum Mood Disorders; Comprehensive List of Free Support Groups; Knowledgeable local coordinators who will offer support, information, and resources; Guide to Resources on aaTag; Calendar of events in the  mood disorders community; Latest News and Research; and [facility] [address] for [employer]. Remember - You are not alone; You are not to blame; With help, you will be well. 1-341-007-PPD(3273). WWW. POSTPARTUM. NET   · Writing or talking about death, such as writing suicide notes or talking about guns, knives, or pills. Keep the numbers for these national suicide hotlines: 3-998-682-TALK (6-178.407.8759) and 2-038-PDDUOLU (2-627.649.5730). If you or someone you know talks about suicide or feeling hopeless, get help right away. Constipation and Hemorrhoids  · Drink plenty of fluids, enough so that your urine is light yellow or clear like water. If you have kidney, heart, or liver disease and have to limit fluids, talk with your doctor before you increase the amount of fluids you drink. · Eat plenty of fiber each day. Have a bran muffin or bran cereal for breakfast, and try eating a piece of fruit for a mid-afternoon snack. · For painful, itchy hemorrhoids, put ice or a cold pack on the area several times a day for 10 minutes at a time. Follow this by putting a warm compress on the area for another 10 to 20 minutes or by sitting in a shallow, warm bath. When should you call for help? Call 911 anytime you think you may need emergency care.  For example, call if:  · You are thinking of hurting yourself, your baby, or anyone else. · You passed out (lost consciousness). · You have symptoms of a blood clot in your lung (called a pulmonary embolism). These may include:    · Sudden chest pain. · Trouble breathing. · Coughing up blood. Call your doctor now or seek immediate medical care if:  · You have severe vaginal bleeding. · You are soaking through a pad each hour for 2 or more hours. · Your vaginal bleeding seems to be getting heavier or is still bright red 4 days after delivery. · You are dizzy or lightheaded, or you feel like you may faint. · You are vomiting or cannot keep fluids down. · You have a fever. · You have new or more belly pain. · You pass tissue (not just blood). · Your vaginal discharge smells bad. · Your belly feels tender or full and hard. · Your breasts are continuously painful or red. · You feel sad, anxious, or hopeless for more than a few days. · You have sudden, severe pain in your belly. · You have symptoms of a blood clot in your leg (called a deep vein thrombosis),          such as:  · Pain in your calf, back of the knee, thigh, or groin. · Redness and swelling in your leg or groin. · You have symptoms of preeclampsia, such as:  · Sudden swelling of your face, hands, or feet. · New vision problems (such as dimness or blurring). · A severe headache. · Your blood pressure is higher than it should be or rises suddenly. · You have new nausea or vomiting. Watch closely for changes in your health, and be sure to contact your doctor if you have any problems.

## 2021-11-07 NOTE — PROGRESS NOTES
Postpartum Progress Note (VD)    Patient: Alejandro Berry MRN: [id number]  SSN: [id number]    YOB: 1990  Age: 27 y.o. Sex: female      Subjective:     Postpartum Day: 3    Delivery: Spontaneous vaginal delivery    The patient feels well. The patient denies emotional concerns. Pain is  well controlled with current medications. Voiding spontaneous. The patient is ambulating well. The patient is tolerating a normal diet. Lochia is light. The baby is doing well and out of SCN (out PPD#1), but had lost weight, so was kept yesterday, but patient reports he has started to gain a little and it well, they anticipate he will be discharged today. Baby is feeding via breast.    Objective:      Patient Vitals for the past 8 hrs:   BP Temp Pulse Resp SpO2   11/07/21 0020 125/62 97.7 °F (36.5 °C) 60 16 95 %     General:    alert, cooperative, no distress   Bowel Sounds:  active   Lochia:  appropriate   Uterine Fundus:   firm   Fundus Location:  -1   Perineum:  not examined   DVT Evaluation:  No evidence of DVT seen on physical exam.     Lab/Data Review: All lab results for the last 24 hours reviewed. Assessment:     Status post: Doing well postpartum vaginal delivery     Plan:     - Postpartum care discussed including diet, ambulation, and actvitiy restrictions. - Chorio: Single dose abx at time of delivery. None pp. No signs of infection.   - Discharge planning for PPD #3, today  - Discharge instructions for vaginal delivery reviewed.       Inderjit Yarbrough MD

## 2021-11-07 NOTE — DISCHARGE SUMMARY
Obstetrical Discharge Summary     Name: Martha Baires MRN: [id number]  SSN: [id number]    YOB: 1990  Age: 27 y.o. Sex: female      Allergies: Pcn [penicillins]    Admit Date: 11/3/2021    Discharge Date: 2021     Admitting Physician: Viridiana Hopson MD     Attending Physician:  lEder Reynolds MD     * Admission Diagnoses: Normal labor [O80, Z37.9]    * Discharge Diagnoses:   Information for the patient's :  [place] [place] [[id number]]   Delivery of a 3.45 kg male infant via Vaginal, Spontaneous on 2021 at 9:09 PM  by Luis Negron. Apgars were   and  . Additional Diagnoses:   Hospital Problems as of 2021 Date Reviewed: 2021          Codes Class Noted - Resolved POA    39 weeks gestation of pregnancy ICD-10-CM: Z3A.39  ICD-9-CM: V22.2  2021 - Present Yes        * (Principal) Encounter for induction of labor ICD-10-CM: Z34.90  ICD-9-CM: V22.1  11/3/2021 - Present Yes             Lab Results   Component Value Date/Time    ABO/Rh(D) O POSITIVE 2021 07:02 PM    Rubella, External immune 2021 12:00 AM    GrBStrep, External negative 10/15/2021 12:00 AM      Immunization History   Administered Date(s) Administered    Hep A Vaccine (Adult) 2018    Influenza Vaccine 10/27/2018    TB Skin Test (PPD) Intradermal 11/10/2015       * Procedures: Spontaneous vaginal delivery           * Discharge Condition: good    * Hospital Course: Delivery complicated by chorioamnionitis. Normal hospital course following the delivery, but kept an additional day for maternal/ fetal monitoring. * Disposition: Home    Discharge Medications:   Current Discharge Medication List      CONTINUE these medications which have NOT CHANGED    Details   prenatal vit-calcium-iron-fa (Prenatal Plus, calcium carb,) 27 mg iron- 1 mg tab Take 1 Tablet by mouth daily.          STOP taking these medications       ISOtretinoin (ACCUTANE) 40 mg capsule Comments:   Reason for Stopping: levonorgestrel (MIRENA) 20 mcg/24 hr (5 years) IUD Comments:   Reason for Stopping:               * Follow-up Care/Patient Instructions: Activity: Activity as tolerated  Diet: Regular Diet  Wound Care:  Ice to area for comfort    Follow-up Information     Follow up With Specialties Details Why Contact Info    Ryanne Pena MD Obstetrics & Gynecology Schedule an appointment as soon as possible for a visit in 6 weeks Postpartum visit [facility]. [address] [place] [address], [address] [place] [address] Rd, [address] [place] [address] Internal Medicine   [address] [place] [address] [address]           Néstor Dong MD

## 2021-11-08 NOTE — PROGRESS NOTES
OB notified of EPDS score (10) via fax.     SHANI Marie, [address] [facility]   [phone]

## 2021-11-30 ENCOUNTER — TELEPHONE (OUTPATIENT)
Dept: CASE MANAGEMENT | Age: 31
End: 2021-11-30

## 2021-11-30 NOTE — TELEPHONE ENCOUNTER
Phone call to patient at [phone] due to EPDS score of 10 after delivery. No answer; voicemail left requesting callback.     SHANI Ennis, [address] [facility]   [phone]

## 2021-12-13 ENCOUNTER — TELEPHONE (OUTPATIENT)
Dept: CASE MANAGEMENT | Age: 31
End: 2021-12-13

## 2021-12-13 NOTE — TELEPHONE ENCOUNTER
Phone call to patient at [phone] due to EPDS score of 10 after delivery.     Patient states that she and baby Dorys Joseph are doing well. Patient has not experienced any depression/anxiety since discharging home. Per patient, \"We have a great support system. \"    Patient without any needs from  at this time. She states that she will call me if any needs/questions arise.     SHANI Guerin, [facility]  [facility]   [phone]       SHANI Guerin, [facility]   [phone]

## 2022-03-19 PROBLEM — Z3A.39 39 WEEKS GESTATION OF PREGNANCY: Status: ACTIVE | Noted: 2021-11-04

## 2022-03-19 PROBLEM — Z97.5 IUD (INTRAUTERINE DEVICE) IN PLACE: Status: ACTIVE | Noted: 2018-06-27

## 2022-03-19 PROBLEM — R87.612 LOW GRADE SQUAMOUS INTRAEPITHELIAL LESION ON CYTOLOGIC SMEAR OF CERVIX (LGSIL): Status: ACTIVE | Noted: 2018-06-27

## 2022-03-20 PROBLEM — Z34.90 ENCOUNTER FOR INDUCTION OF LABOR: Status: ACTIVE | Noted: 2021-11-03

## 2023-09-29 ENCOUNTER — HOSPITAL ENCOUNTER (EMERGENCY)
Age: 33
Discharge: HOME | End: 2023-09-29
Attending: EMERGENCY MEDICINE
Payer: COMMERCIAL

## 2023-09-29 VITALS
OXYGEN SATURATION: 97 % | HEIGHT: 69 IN | HEART RATE: 66 BPM | BODY MASS INDEX: 28.14 KG/M2 | DIASTOLIC BLOOD PRESSURE: 70 MMHG | TEMPERATURE: 98.3 F | WEIGHT: 190 LBS | SYSTOLIC BLOOD PRESSURE: 121 MMHG | RESPIRATION RATE: 16 BRPM

## 2023-09-29 DIAGNOSIS — R07.9 ACUTE CHEST PAIN: Primary | ICD-10-CM

## 2023-09-29 LAB
ANION GAP SERPL CALC-SCNC: 6 MMOL/L (ref 2–11)
BASOPHILS # BLD: 0 K/UL (ref 0–0.2)
BASOPHILS NFR BLD: 0 % (ref 0–2)
BUN SERPL-MCNC: 7 MG/DL (ref 6–23)
CALCIUM SERPL-MCNC: 8.5 MG/DL (ref 8.3–10.4)
CHLORIDE SERPL-SCNC: 110 MMOL/L (ref 101–110)
CO2 SERPL-SCNC: 23 MMOL/L (ref 21–32)
CREAT SERPL-MCNC: 0.7 MG/DL (ref 0.6–1)
DIFFERENTIAL METHOD BLD: ABNORMAL
EKG ATRIAL RATE: 65 BPM
EKG DIAGNOSIS: NORMAL
EKG P AXIS: 30 DEGREES
EKG P-R INTERVAL: 166 MS
EKG Q-T INTERVAL: 394 MS
EKG QRS DURATION: 94 MS
EKG QTC CALCULATION (BAZETT): 410 MS
EKG R AXIS: 59 DEGREES
EKG T AXIS: 21 DEGREES
EKG VENTRICULAR RATE: 65 BPM
EOSINOPHIL # BLD: 0.2 K/UL (ref 0–0.8)
EOSINOPHIL NFR BLD: 2 % (ref 0.5–7.8)
ERYTHROCYTE [DISTWIDTH] IN BLOOD BY AUTOMATED COUNT: 12.4 % (ref 11.9–14.6)
GLUCOSE SERPL-MCNC: 97 MG/DL (ref 65–100)
HCT VFR BLD AUTO: 36.2 % (ref 35.8–46.3)
HGB BLD-MCNC: 12.2 G/DL (ref 11.7–15.4)
IMM GRANULOCYTES # BLD AUTO: 0 K/UL (ref 0–0.5)
IMM GRANULOCYTES NFR BLD AUTO: 1 % (ref 0–5)
LYMPHOCYTES # BLD: 1.7 K/UL (ref 0.5–4.6)
LYMPHOCYTES NFR BLD: 19 % (ref 13–44)
MCH RBC QN AUTO: 29 PG (ref 26.1–32.9)
MCHC RBC AUTO-ENTMCNC: 33.7 G/DL (ref 31.4–35)
MCV RBC AUTO: 86.2 FL (ref 82–102)
MONOCYTES # BLD: 0.6 K/UL (ref 0.1–1.3)
MONOCYTES NFR BLD: 7 % (ref 4–12)
NEUTS SEG # BLD: 6.2 K/UL (ref 1.7–8.2)
NEUTS SEG NFR BLD: 72 % (ref 43–78)
NRBC # BLD: 0 K/UL (ref 0–0.2)
PLATELET # BLD AUTO: 224 K/UL (ref 150–450)
PMV BLD AUTO: 9.3 FL (ref 9.4–12.3)
POTASSIUM SERPL-SCNC: 3.6 MMOL/L (ref 3.5–5.1)
RBC # BLD AUTO: 4.2 M/UL (ref 4.05–5.2)
SODIUM SERPL-SCNC: 139 MMOL/L (ref 133–143)
TROPONIN I SERPL HS-MCNC: 17 PG/ML (ref 0–14)
TROPONIN I SERPL HS-MCNC: 17.6 PG/ML (ref 0–14)
WBC # BLD AUTO: 8.7 K/UL (ref 4.3–11.1)

## 2023-09-29 PROCEDURE — 84484 ASSAY OF TROPONIN QUANT: CPT

## 2023-09-29 PROCEDURE — 80048 BASIC METABOLIC PNL TOTAL CA: CPT

## 2023-09-29 PROCEDURE — 85025 COMPLETE CBC W/AUTO DIFF WBC: CPT

## 2023-09-29 PROCEDURE — 94762 N-INVAS EAR/PLS OXIMTRY CONT: CPT

## 2023-09-29 PROCEDURE — 93005 ELECTROCARDIOGRAM TRACING: CPT | Performed by: EMERGENCY MEDICINE

## 2023-09-29 PROCEDURE — 99284 EMERGENCY DEPT VISIT MOD MDM: CPT

## 2023-09-29 PROCEDURE — 93010 ELECTROCARDIOGRAM REPORT: CPT | Performed by: INTERNAL MEDICINE

## 2023-09-29 RX ORDER — FAMOTIDINE 20 MG/1
20 TABLET, FILM COATED ORAL 2 TIMES DAILY
Qty: 60 TABLET | Refills: 0 | Status: SHIPPED | OUTPATIENT
Start: 2023-09-29

## 2023-09-29 ASSESSMENT — PAIN - FUNCTIONAL ASSESSMENT: PAIN_FUNCTIONAL_ASSESSMENT: 0-10

## 2023-09-29 ASSESSMENT — ENCOUNTER SYMPTOMS
SHORTNESS OF BREATH: 0
ABDOMINAL PAIN: 0
WHEEZING: 0
NAUSEA: 0
VOMITING: 0

## 2023-09-29 ASSESSMENT — PAIN SCALES - GENERAL: PAINLEVEL_OUTOF10: 2

## 2023-09-29 ASSESSMENT — LIFESTYLE VARIABLES
HOW MANY STANDARD DRINKS CONTAINING ALCOHOL DO YOU HAVE ON A TYPICAL DAY: PATIENT DOES NOT DRINK
HOW OFTEN DO YOU HAVE A DRINK CONTAINING ALCOHOL: NEVER

## 2023-09-29 NOTE — ED TRIAGE NOTES
Pt reports left sided chest pain that radiate down left arm for a few days, worsening tonight. Pt denies any past medical problems. Pt reports being 7 months pregnant.

## 2023-09-29 NOTE — ED PROVIDER NOTES
Emergency Department Provider Note       PCP: Aristides Medley MD   Age: 28 y.o. Sex: female     DISPOSITION Decision To Discharge 2023 03:08:28 AM       ICD-10-CM    1. Acute chest pain  R07.9           Medical Decision Making     Complexity of Problems Addressed:  1 or more acute illnesses that pose a threat to life or bodily function. Data Reviewed and Analyzed:  I independently ordered and reviewed each unique test.  I reviewed external records: provider visit note from outside specialist.           Discussion of management or test interpretation. 78-year-old female at approximately 34 weeks gestation presents with substernal chest discomfort. EKG unremarkable, troponin minimally elevated at 17 initially, repeat greater than 90 minutes later was 17.6. CBC and BMP were within normal limits. I suspect the patient's symptoms are directly related to reflux as opposed to any cardiac etiology. Patient be discharged home on a short course of Pepcid. Risk of Complications and/or Morbidity of Patient Management:  Prescription drug management performed. ED Course as of 23 0717   Fri Sep 29, 2023   0103 ECG interpretation for ECG dated 2023 at 12:56 AM: ECG reveals a normal sinus rhythm rate of 65 bpm with normal OR and QTc intervals normal axis. Normal ECG. Jarred Pacheco MD   [BB]      ED Course User Index  [BB] Jarred Pacheco MD       History       28year-old  at approximately 34 weeks presents to the emergency department complaining of some substernal chest tightness radiating down both arms this evening awakening her from sleep. Patient had a couple episodes over the last couple of days of similar chest tightness, but without the radiation to the arms. No known exacerbating or alleviating factors. Tonight it awakened her from sleep. She denies any associated shortness of breath, diaphoresis, nausea or vomiting.   She does have some mild indigestion symptoms Time: 09/29/23 12:56 AM   Result Value Ref Range    Ventricular Rate 65 BPM    Atrial Rate 65 BPM    P-R Interval 166 ms    QRS Duration 94 ms    Q-T Interval 394 ms    QTc Calculation (Bazett) 410 ms    P Axis 30 degrees    R Axis 59 degrees    T Axis 21 degrees    Diagnosis       Sinus rhythm  Low voltage, precordial leads      Confirmed by Eunice Duarte MD ([id number]) on 9/29/2023 6:33:17 AM     Basic Metabolic Panel    Collection Time: 09/29/23 12:59 AM   Result Value Ref Range    Sodium 139 133 - 143 mmol/L    Potassium 3.6 3.5 - 5.1 mmol/L    Chloride 110 101 - 110 mmol/L    CO2 23 21 - 32 mmol/L    Anion Gap 6 2 - 11 mmol/L    Glucose 97 65 - 100 mg/dL    BUN 7 6 - 23 MG/DL    Creatinine 0.70 0.6 - 1.0 MG/DL    Est, Glom Filt Rate >60 >60 ml/min/1.73m2    Calcium 8.5 8.3 - 10.4 MG/DL   CBC with Auto Differential    Collection Time: 09/29/23 12:59 AM   Result Value Ref Range    WBC 8.7 4.3 - 11.1 K/uL    RBC 4.20 4.05 - 5.2 M/uL    Hemoglobin 12.2 11.7 - 15.4 g/dL    Hematocrit 36.2 35.8 - 46.3 %    MCV 86.2 82.0 - 102.0 FL    MCH 29.0 26.1 - 32.9 PG    MCHC 33.7 31.4 - 35.0 g/dL    RDW 12.4 11.9 - 14.6 %    Platelets 765 921 - 479 K/uL    MPV 9.3 (L) 9.4 - 12.3 FL    nRBC 0.00 0.0 - 0.2 K/uL    Differential Type AUTOMATED      Neutrophils % 72 43 - 78 %    Lymphocytes % 19 13 - 44 %    Monocytes % 7 4.0 - 12.0 %    Eosinophils % 2 0.5 - 7.8 %    Basophils % 0 0.0 - 2.0 %    Immature Granulocytes 1 0.0 - 5.0 %    Neutrophils Absolute 6.2 1.7 - 8.2 K/UL    Lymphocytes Absolute 1.7 0.5 - 4.6 K/UL    Monocytes Absolute 0.6 0.1 - 1.3 K/UL    Eosinophils Absolute 0.2 0.0 - 0.8 K/UL    Basophils Absolute 0.0 0.0 - 0.2 K/UL    Absolute Immature Granulocyte 0.0 0.0 - 0.5 K/UL   Troponin    Collection Time: 09/29/23 12:59 AM   Result Value Ref Range    Troponin, High Sensitivity 17.0 (H) 0 - 14 pg/mL   Troponin    Collection Time: 09/29/23  2:31 AM   Result Value Ref Range    Troponin, High Sensitivity 17.6 (H) 0 - 14

## 2023-12-01 ENCOUNTER — ANESTHESIA (OUTPATIENT)
Dept: LABOR AND DELIVERY | Age: 33
End: 2023-12-01
Payer: COMMERCIAL

## 2023-12-01 ENCOUNTER — HOSPITAL ENCOUNTER (INPATIENT)
Age: 33
LOS: 2 days | Discharge: HOME | End: 2023-12-03
Attending: OBSTETRICS & GYNECOLOGY | Admitting: OBSTETRICS & GYNECOLOGY
Payer: COMMERCIAL

## 2023-12-01 ENCOUNTER — ANESTHESIA EVENT (OUTPATIENT)
Dept: LABOR AND DELIVERY | Age: 33
End: 2023-12-01
Payer: COMMERCIAL

## 2023-12-01 ENCOUNTER — APPOINTMENT (OUTPATIENT)
Dept: LABOR AND DELIVERY | Age: 33
End: 2023-12-01
Payer: COMMERCIAL

## 2023-12-01 PROBLEM — Z34.90 ENCOUNTER FOR ELECTIVE INDUCTION OF LABOR: Status: ACTIVE | Noted: 2023-12-01

## 2023-12-01 LAB
ABO + RH BLD: NORMAL
BASOPHILS # BLD: 0 K/UL (ref 0–0.2)
BASOPHILS NFR BLD: 0 % (ref 0–2)
BLOOD GROUP ANTIBODIES SERPL: NORMAL
DIFFERENTIAL METHOD BLD: ABNORMAL
EOSINOPHIL # BLD: 0.1 K/UL (ref 0–0.8)
EOSINOPHIL NFR BLD: 2 % (ref 0.5–7.8)
ERYTHROCYTE [DISTWIDTH] IN BLOOD BY AUTOMATED COUNT: 13.5 % (ref 11.9–14.6)
HCT VFR BLD AUTO: 33.3 % (ref 35.8–46.3)
HGB BLD-MCNC: 10.8 G/DL (ref 11.7–15.4)
IMM GRANULOCYTES # BLD AUTO: 0.1 K/UL (ref 0–0.5)
IMM GRANULOCYTES NFR BLD AUTO: 1 % (ref 0–5)
LYMPHOCYTES # BLD: 1.2 K/UL (ref 0.5–4.6)
LYMPHOCYTES NFR BLD: 16 % (ref 13–44)
MCH RBC QN AUTO: 27 PG (ref 26.1–32.9)
MCHC RBC AUTO-ENTMCNC: 32.4 G/DL (ref 31.4–35)
MCV RBC AUTO: 83.3 FL (ref 82–102)
MONOCYTES # BLD: 0.4 K/UL (ref 0.1–1.3)
MONOCYTES NFR BLD: 5 % (ref 4–12)
NEUTS SEG # BLD: 5.8 K/UL (ref 1.7–8.2)
NEUTS SEG NFR BLD: 77 % (ref 43–78)
NRBC # BLD: 0 K/UL (ref 0–0.2)
PLATELET # BLD AUTO: 194 K/UL (ref 150–450)
PMV BLD AUTO: 9.7 FL (ref 9.4–12.3)
RBC # BLD AUTO: 4 M/UL (ref 4.05–5.2)
SPECIMEN EXP DATE BLD: NORMAL
WBC # BLD AUTO: 7.6 K/UL (ref 4.3–11.1)

## 2023-12-01 PROCEDURE — 6370000000 HC RX 637 (ALT 250 FOR IP): Performed by: OBSTETRICS & GYNECOLOGY

## 2023-12-01 PROCEDURE — 3E033VJ INTRODUCTION OF OTHER HORMONE INTO PERIPHERAL VEIN, PERCUTANEOUS APPROACH: ICD-10-PCS | Performed by: OBSTETRICS & GYNECOLOGY

## 2023-12-01 PROCEDURE — 7220000101 HC DELIVERY VAGINAL/SINGLE

## 2023-12-01 PROCEDURE — 2500000003 HC RX 250 WO HCPCS: Performed by: ANESTHESIOLOGY

## 2023-12-01 PROCEDURE — 86850 RBC ANTIBODY SCREEN: CPT

## 2023-12-01 PROCEDURE — 85025 COMPLETE CBC W/AUTO DIFF WBC: CPT

## 2023-12-01 PROCEDURE — 7100000010 HC PHASE II RECOVERY - FIRST 15 MIN

## 2023-12-01 PROCEDURE — 51701 INSERT BLADDER CATHETER: CPT

## 2023-12-01 PROCEDURE — 6360000002 HC RX W HCPCS: Performed by: ANESTHESIOLOGY

## 2023-12-01 PROCEDURE — 00HU33Z INSERTION OF INFUSION DEVICE INTO SPINAL CANAL, PERCUTANEOUS APPROACH: ICD-10-PCS | Performed by: ANESTHESIOLOGY

## 2023-12-01 PROCEDURE — 1100000000 HC RM PRIVATE

## 2023-12-01 PROCEDURE — 86900 BLOOD TYPING SEROLOGIC ABO: CPT

## 2023-12-01 PROCEDURE — 6360000002 HC RX W HCPCS: Performed by: OBSTETRICS & GYNECOLOGY

## 2023-12-01 PROCEDURE — 7210000100 HC LABOR FEE PER 1 HR

## 2023-12-01 PROCEDURE — 2580000003 HC RX 258: Performed by: OBSTETRICS & GYNECOLOGY

## 2023-12-01 PROCEDURE — 86901 BLOOD TYPING SEROLOGIC RH(D): CPT

## 2023-12-01 PROCEDURE — 3700000025 EPIDURAL BLOCK: Performed by: ANESTHESIOLOGY

## 2023-12-01 PROCEDURE — 10907ZC DRAINAGE OF AMNIOTIC FLUID, THERAPEUTIC FROM PRODUCTS OF CONCEPTION, VIA NATURAL OR ARTIFICIAL OPENING: ICD-10-PCS | Performed by: OBSTETRICS & GYNECOLOGY

## 2023-12-01 PROCEDURE — 59025 FETAL NON-STRESS TEST: CPT

## 2023-12-01 PROCEDURE — 7100000011 HC PHASE II RECOVERY - ADDTL 15 MIN

## 2023-12-01 RX ORDER — OXYCODONE HYDROCHLORIDE 5 MG/1
5 TABLET ORAL EVERY 4 HOURS PRN
Status: DISCONTINUED | OUTPATIENT
Start: 2023-12-01 | End: 2023-12-03 | Stop reason: HOSPADM

## 2023-12-01 RX ORDER — DOCUSATE SODIUM 100 MG/1
100 CAPSULE, LIQUID FILLED ORAL 2 TIMES DAILY
Status: DISCONTINUED | OUTPATIENT
Start: 2023-12-01 | End: 2023-12-03 | Stop reason: HOSPADM

## 2023-12-01 RX ORDER — DEXTROSE, SODIUM CHLORIDE, SODIUM LACTATE, POTASSIUM CHLORIDE, AND CALCIUM CHLORIDE 5; .6; .31; .03; .02 G/100ML; G/100ML; G/100ML; G/100ML; G/100ML
INJECTION, SOLUTION INTRAVENOUS CONTINUOUS
Status: DISCONTINUED | OUTPATIENT
Start: 2023-12-01 | End: 2023-12-01

## 2023-12-01 RX ORDER — LIDOCAINE HYDROCHLORIDE AND EPINEPHRINE 15; 5 MG/ML; UG/ML
INJECTION, SOLUTION EPIDURAL PRN
Status: DISCONTINUED | OUTPATIENT
Start: 2023-12-01 | End: 2023-12-01 | Stop reason: SDUPTHER

## 2023-12-01 RX ORDER — OXYCODONE HYDROCHLORIDE 5 MG/1
10 TABLET ORAL EVERY 4 HOURS PRN
Status: DISCONTINUED | OUTPATIENT
Start: 2023-12-01 | End: 2023-12-03 | Stop reason: HOSPADM

## 2023-12-01 RX ORDER — LANOLIN
CREAM (ML) TOPICAL PRN
Status: DISCONTINUED | OUTPATIENT
Start: 2023-12-01 | End: 2023-12-03 | Stop reason: HOSPADM

## 2023-12-01 RX ORDER — SODIUM CHLORIDE 0.9 % (FLUSH) 0.9 %
5-40 SYRINGE (ML) INJECTION EVERY 12 HOURS SCHEDULED
Status: DISCONTINUED | OUTPATIENT
Start: 2023-12-01 | End: 2023-12-01

## 2023-12-01 RX ORDER — IBUPROFEN 600 MG/1
600 TABLET ORAL EVERY 6 HOURS PRN
Status: DISCONTINUED | OUTPATIENT
Start: 2023-12-01 | End: 2023-12-03 | Stop reason: HOSPADM

## 2023-12-01 RX ORDER — ONDANSETRON 2 MG/ML
4 INJECTION INTRAMUSCULAR; INTRAVENOUS EVERY 6 HOURS PRN
Status: DISCONTINUED | OUTPATIENT
Start: 2023-12-01 | End: 2023-12-01

## 2023-12-01 RX ORDER — SODIUM CHLORIDE 0.9 % (FLUSH) 0.9 %
5-40 SYRINGE (ML) INJECTION PRN
Status: DISCONTINUED | OUTPATIENT
Start: 2023-12-01 | End: 2023-12-03 | Stop reason: HOSPADM

## 2023-12-01 RX ORDER — SODIUM CHLORIDE 0.9 % (FLUSH) 0.9 %
5-40 SYRINGE (ML) INJECTION PRN
Status: DISCONTINUED | OUTPATIENT
Start: 2023-12-01 | End: 2023-12-01

## 2023-12-01 RX ORDER — SODIUM CHLORIDE 9 MG/ML
INJECTION, SOLUTION INTRAVENOUS PRN
Status: DISCONTINUED | OUTPATIENT
Start: 2023-12-01 | End: 2023-12-03 | Stop reason: HOSPADM

## 2023-12-01 RX ORDER — METHYLERGONOVINE MALEATE 0.2 MG/ML
200 INJECTION INTRAVENOUS PRN
Status: DISCONTINUED | OUTPATIENT
Start: 2023-12-01 | End: 2023-12-01

## 2023-12-01 RX ORDER — SODIUM CHLORIDE, SODIUM LACTATE, POTASSIUM CHLORIDE, AND CALCIUM CHLORIDE .6; .31; .03; .02 G/100ML; G/100ML; G/100ML; G/100ML
500 INJECTION, SOLUTION INTRAVENOUS PRN
Status: DISCONTINUED | OUTPATIENT
Start: 2023-12-01 | End: 2023-12-01

## 2023-12-01 RX ORDER — SODIUM CHLORIDE, SODIUM LACTATE, POTASSIUM CHLORIDE, AND CALCIUM CHLORIDE .6; .31; .03; .02 G/100ML; G/100ML; G/100ML; G/100ML
1000 INJECTION, SOLUTION INTRAVENOUS PRN
Status: DISCONTINUED | OUTPATIENT
Start: 2023-12-01 | End: 2023-12-01

## 2023-12-01 RX ORDER — SODIUM CHLORIDE 9 MG/ML
25 INJECTION, SOLUTION INTRAVENOUS PRN
Status: DISCONTINUED | OUTPATIENT
Start: 2023-12-01 | End: 2023-12-01

## 2023-12-01 RX ORDER — SODIUM CHLORIDE, SODIUM LACTATE, POTASSIUM CHLORIDE, CALCIUM CHLORIDE 600; 310; 30; 20 MG/100ML; MG/100ML; MG/100ML; MG/100ML
INJECTION, SOLUTION INTRAVENOUS CONTINUOUS
Status: DISCONTINUED | OUTPATIENT
Start: 2023-12-01 | End: 2023-12-01

## 2023-12-01 RX ORDER — ROPIVACAINE HYDROCHLORIDE 2 MG/ML
INJECTION, SOLUTION EPIDURAL; INFILTRATION; PERINEURAL PRN
Status: DISCONTINUED | OUTPATIENT
Start: 2023-12-01 | End: 2023-12-01

## 2023-12-01 RX ORDER — MISOPROSTOL 200 UG/1
800 TABLET ORAL PRN
Status: DISCONTINUED | OUTPATIENT
Start: 2023-12-01 | End: 2023-12-01

## 2023-12-01 RX ORDER — CARBOPROST TROMETHAMINE 250 UG/ML
250 INJECTION, SOLUTION INTRAMUSCULAR PRN
Status: DISCONTINUED | OUTPATIENT
Start: 2023-12-01 | End: 2023-12-01

## 2023-12-01 RX ORDER — ROPIVACAINE HYDROCHLORIDE 2 MG/ML
INJECTION, SOLUTION EPIDURAL; INFILTRATION; PERINEURAL CONTINUOUS PRN
Status: DISCONTINUED | OUTPATIENT
Start: 2023-12-01 | End: 2023-12-01 | Stop reason: SDUPTHER

## 2023-12-01 RX ORDER — ONDANSETRON 2 MG/ML
4 INJECTION INTRAMUSCULAR; INTRAVENOUS EVERY 6 HOURS PRN
Status: DISCONTINUED | OUTPATIENT
Start: 2023-12-01 | End: 2023-12-03 | Stop reason: HOSPADM

## 2023-12-01 RX ORDER — CALCIUM CARBONATE 500 MG/1
1 TABLET, CHEWABLE ORAL DAILY
Status: ON HOLD | COMMUNITY
End: 2023-12-03 | Stop reason: HOSPADM

## 2023-12-01 RX ORDER — ACETAMINOPHEN 500 MG
1000 TABLET ORAL EVERY 6 HOURS SCHEDULED
Status: DISCONTINUED | OUTPATIENT
Start: 2023-12-01 | End: 2023-12-03 | Stop reason: HOSPADM

## 2023-12-01 RX ORDER — ROPIVACAINE HYDROCHLORIDE 5 MG/ML
INJECTION, SOLUTION EPIDURAL; INFILTRATION; PERINEURAL PRN
Status: DISCONTINUED | OUTPATIENT
Start: 2023-12-01 | End: 2023-12-01 | Stop reason: SDUPTHER

## 2023-12-01 RX ORDER — TRANEXAMIC ACID 10 MG/ML
1000 INJECTION, SOLUTION INTRAVENOUS
Status: DISCONTINUED | OUTPATIENT
Start: 2023-12-01 | End: 2023-12-01

## 2023-12-01 RX ORDER — DOCUSATE SODIUM 100 MG/1
100 CAPSULE, LIQUID FILLED ORAL 2 TIMES DAILY
Status: ON HOLD | COMMUNITY
End: 2023-12-03 | Stop reason: HOSPADM

## 2023-12-01 RX ORDER — TERBUTALINE SULFATE 1 MG/ML
0.25 INJECTION, SOLUTION SUBCUTANEOUS
Status: DISCONTINUED | OUTPATIENT
Start: 2023-12-01 | End: 2023-12-01

## 2023-12-01 RX ORDER — ACETAMINOPHEN 500 MG
1000 TABLET ORAL EVERY 6 HOURS SCHEDULED
Status: DISCONTINUED | OUTPATIENT
Start: 2023-12-01 | End: 2023-12-01

## 2023-12-01 RX ADMIN — WITCH HAZEL: 500 SOLUTION RECTAL; TOPICAL at 17:04

## 2023-12-01 RX ADMIN — IBUPROFEN 600 MG: 600 TABLET, FILM COATED ORAL at 17:04

## 2023-12-01 RX ADMIN — LIDOCAINE HYDROCHLORIDE,EPINEPHRINE BITARTRATE 3 ML: 15; .005 INJECTION, SOLUTION EPIDURAL; INFILTRATION; INTRACAUDAL; PERINEURAL at 12:28

## 2023-12-01 RX ADMIN — OXYTOCIN 2 MILLI-UNITS/MIN: 10 INJECTION, SOLUTION INTRAMUSCULAR; INTRAVENOUS at 08:17

## 2023-12-01 RX ADMIN — ROPIVACAINE HYDROCHLORIDE 8 ML/HR: 2 INJECTION, SOLUTION EPIDURAL; INFILTRATION; PERINEURAL at 10:00

## 2023-12-01 RX ADMIN — LIDOCAINE HYDROCHLORIDE,EPINEPHRINE BITARTRATE 4 ML: 15; .005 INJECTION, SOLUTION EPIDURAL; INFILTRATION; INTRACAUDAL; PERINEURAL at 12:39

## 2023-12-01 RX ADMIN — Medication: at 17:04

## 2023-12-01 RX ADMIN — ROPIVACAINE HYDROCHLORIDE 10 ML: 5 INJECTION, SOLUTION EPIDURAL; INFILTRATION; PERINEURAL at 09:27

## 2023-12-01 RX ADMIN — SODIUM CHLORIDE, SODIUM LACTATE, POTASSIUM CHLORIDE, CALCIUM CHLORIDE AND DEXTROSE MONOHYDRATE: 5; 600; 310; 30; 20 INJECTION, SOLUTION INTRAVENOUS at 07:53

## 2023-12-01 ASSESSMENT — PAIN SCALES - GENERAL: PAINLEVEL_OUTOF10: 1

## 2023-12-01 NOTE — PROGRESS NOTES
Admit to 428 for induction of labor. Monitoring in place. Consents signed and witnessed. IV placed to right hand x 1 attempt. Unable to get blood from IV site. Lab called to collect blood. Tolerated procedures well.

## 2023-12-01 NOTE — ANESTHESIA POSTPROCEDURE EVALUATION
Department of Anesthesiology  Postprocedure Note    Patient: Shayna Prescott  MRN: [id number]  YOB: 1990  Date of evaluation: 12/1/2023      Procedure Summary     Date: 12/01/23 Room / Location:     Anesthesia Start: 1251 Anesthesia Stop: 1250    Procedure: Labor Analgesia Diagnosis:     Scheduled Providers:  Responsible Provider: Shauna Kyle MD    Anesthesia Type: epidural ASA Status: 2          Anesthesia Type: No value filed.     Fernando Phase I:      Fernando Phase II:        Anesthesia Post Evaluation    Patient location during evaluation: PACU  Patient participation: complete - patient participated  Level of consciousness: awake and alert  Airway patency: patent  Nausea & Vomiting: no nausea  Cardiovascular status: hemodynamically stable  Respiratory status: acceptable  Hydration status: euvolemic  Comments: Comfortable for delivery  Pain management: adequate and satisfactory to patient

## 2023-12-01 NOTE — PROGRESS NOTES
12/01/23 1225   Cervical Exam   Dilation (cm) 10   Effacement 100   Station 0   Station (Labor Curve Graph) 5   OB Examiner KRISTY RN     Table set and pushing

## 2023-12-01 NOTE — PROGRESS NOTES
Stacy care done. Doesn't want to be cathed now. States she can feel too much. Uterus is midline and U-2.

## 2023-12-01 NOTE — PROGRESS NOTES
12/01/23 1057   Cervical Exam   Dilation (cm) 6   Effacement 100   Station 0   Station (Labor Curve Graph) 5   OB Examiner Dr Lee Ann Alamo

## 2023-12-01 NOTE — ANESTHESIA PROCEDURE NOTES
Epidural Block    Patient location during procedure: OB  Start time: 12/1/2023 9:24 AM  End time: 12/1/2023 9:28 AM  Reason for block: labor epidural  Staffing  Performed: anesthesiologist   Anesthesiologist: Liya Rider MD  Performed by: Liya Rider MD  Authorized by: Liya Rider MD    Epidural  Patient position: sitting  Prep: ChloraPrep  Patient monitoring: continuous pulse ox and frequent blood pressure checks  Approach: midline  Location: L3-4  Injection technique: MIGUELITO saline  Provider prep: mask and sterile gloves  Needle  Needle type: Tuohy   Needle gauge: 17 G  Needle length: 3.5 in  Catheter type: end hole  Catheter size: 19 G  Test dose: negativeCatheter Secured: tegaderm  Assessment  Hemodynamics: stable  Attempts: 1  Outcomes: uncomplicated  Preanesthetic Checklist  Completed: patient identified, IV checked, site marked, risks and benefits discussed, surgical/procedural consents, equipment checked, timeout performed, anesthesia consent given, oxygen available and monitors applied/VS acknowledged

## 2023-12-01 NOTE — H&P
OB History & Physical    Name: Surjit Cody MRN: [id number]  SSN: [id number]    YOB: 1990  Age: 28 y.o. Sex: female        Subjective:     Estimated Date of Delivery: None noted. OB History    Para Term  AB Living   2   1 0 0 1   SAB IAB Ectopic Molar Multiple Live Births                    # Outcome Date GA Lbr Yo/2nd Weight Sex Delivery Anes PTL Lv   1 Current                Ms. Sekou Quinones is admitted with pregnancy at Unknown for induction of labor. Prenatal course was normal. Please see prenatal records for details. Patient reports normal active fetal movement. Past Medical History:   Diagnosis Date    Abnormal Papanicolaou smear of cervix     HPV but resolved after 6 months    Asthma     Depression     FH: breast cancer in first degree relative     FH: ovarian cancer in first degree relative     HPV in female     Low grade squamous intraepithelial lesion (LGSIL) on Papanicolaou smear of cervix     Trauma      Past Surgical History:   Procedure Laterality Date    WISDOM TOOTH EXTRACTION Left      Social History     Occupational History    Not on file   Tobacco Use    Smoking status: Former     Types: Cigarettes     Quit date: 2012     Years since quittin.4    Smokeless tobacco: Never   Substance and Sexual Activity    Alcohol use: Yes     Alcohol/week: 0.0 standard drinks of alcohol    Drug use: No    Sexual activity: Not on file     Family History   Problem Relation Age of Onset    Breast Cancer Mother 62    Diabetes Maternal Grandfather     Hypertension Father     Elevated Lipids Father     Osteoarthritis Mother     Alcohol Abuse Mother     Cancer Maternal Grandfather         prostate    Cancer Mother 39        cervical (pt thought it was ovarian)breast    Lung Disease Mother     Alcohol Abuse Father        Allergies   Allergen Reactions    Penicillins Rash     Prior to Admission medications    Medication Sig Start Date End Date Taking?  Authorizing Provider

## 2023-12-01 NOTE — PROGRESS NOTES
Patient out of bed with stand by assistance, ambulated with steady gait, voided 600 ml without difficulty, educated on when to notify staff of bleeding/clots, educated on adrian care, patient verbalizes understanding, tolerated well

## 2023-12-01 NOTE — PROGRESS NOTES
12/01/23 1242   Cervical Exam   Station +2   Station (Labor [employer]) 3     Pushing well. Called to set up for delivery.

## 2023-12-01 NOTE — PROGRESS NOTES
12/01/23 1137   Urine Assessment   Urinary Status Catheterization sterile   Urinary Incontinence Absent   Urine Color Yellow/straw   Urine Appearance Clear   Urine Odor No odor   Intermittent/Straight Cath (mL) 150 mL   $ Cath urethra straight $ Yes

## 2023-12-01 NOTE — PROGRESS NOTES
12/01/23 1137 12/01/23 1144 12/01/23 1146   Maternal Vitals (MEW-T)   Patient Position Right side  --   --    Level of Consciousness Alert  --   --    Pain Assessment   Pain Assessment  --   --  None - Denies Pain   Cervical Exam   Dilation (cm)  --  8  --    Effacement  --  90  --    Station  --  -2  --    Station (Labor Curve Graph)  --  7  --    OB Examiner  --  KRISTY RN  --       [facility] [place] notified.

## 2023-12-01 NOTE — L&D DELIVERY NOTE
Delivery of Sukhdev Davis, weight  8-12      Mario Ames [[id number]]      Labor Events     Labor: No   Steroids: None  Cervical Ripening Date/Time:      Antibiotics Received during Labor: No  Rupture Identifier: Sac 1  Rupture Date/Time:  23 08:05:00   Fluid Color: Clear  Fluid Odor: None  Fluid Volume:  Moderate  Induction: AROM, Oxytocin  Augmentation: None  Labor Complications: None              Anesthesia    Method: Epidural       Labor Event Times      Labor onset date/time:  23 08:05:00     Dilation complete date/time:  23 12:25:00     Start pushing date/time:  2023 12:30:00   Decision date/time (emergent ):            Labor Length    1st stage: 4h 20m  2nd stage: 0h 25m  3rd stage: 0h 06m       Delivery Details      Delivery Date: 23 Delivery Time: 12:50:50   Delivery Type: Vaginal, Spontaneous               Presentation    Presentation: Vertex  Position: Right  _: Occiput  _: Anterior       Shoulder Dystocia    Shoulder Dystocia Present?: No                                                                                                           Assisted Delivery Details    Forceps Attempted?: No  Vacuum Extractor Attempted?: No                                                             Cord    Vessels: 3 Vessels  Complications: Nuchal Loose  Delayed Cord Clamping?: Yes  Cord Clamped Date/Time: 2023 12:53:41  Cord Blood Disposition: Lab  Gases Sent?: No              Placenta    Date/Time: 2023 12:57:20  Removal: Expressed  Appearance: Intact  Disposition: Discarded       Lacerations    Episiotomy: None  Perineal Lacerations: None  Other Lacerations: no non-perineal laceration  Number of Repair Packets: 0       Vaginal Counts    Initial Count Personnel: Zeeshan Marsh  Initial Count Verified By: SRIDEVI QURESHI RN  Intial Sponge Count: Correct Intial Needles Count: Correct Intial Instruments Count: Correct   Final Sponges Count: Correct Final Needles

## 2023-12-02 PROCEDURE — 1100000000 HC RM PRIVATE

## 2023-12-02 PROCEDURE — 6370000000 HC RX 637 (ALT 250 FOR IP): Performed by: OBSTETRICS & GYNECOLOGY

## 2023-12-02 RX ADMIN — IBUPROFEN 600 MG: 600 TABLET, FILM COATED ORAL at 09:59

## 2023-12-02 RX ADMIN — DOCUSATE SODIUM 100 MG: 100 CAPSULE, LIQUID FILLED ORAL at 09:59

## 2023-12-02 NOTE — LACTATION NOTE

## 2023-12-02 NOTE — LACTATION NOTE
This note was copied from a baby's chart. In to see mom and infant for the first time. Mom stated that infant has been latching and nursing well. Wanted an observation but infant had just nursed. Informed mom and dad that I could return for next feeding. Answered mom and dad's questions and reviewed with mom how to use her personal breast pump. Also reviewed the second night of life. Lactation consultant to return at the next feeding.

## 2023-12-02 NOTE — CARE COORDINATION
Spoke with pt regarding hx of depression; she denied any current issues. She has good support at home and all needs. Discussed and provided patient with  informational packet on  mood and anxiety disorders (resources/education).

## 2023-12-02 NOTE — LACTATION NOTE
This note was copied from a baby's chart. Back to room to assist with latch. Mom placed infant to her breast in the football hold. Infant latched and nursed for several minutes then came off content. Mom burped infant and placed back to the same breast in the cross cradle hold. Infant latched and nursed for several more minutes and then came off content. Reviewed second night of life. Lactation consultant will follow up tomorrow.

## 2023-12-03 VITALS
OXYGEN SATURATION: 97 % | SYSTOLIC BLOOD PRESSURE: 122 MMHG | RESPIRATION RATE: 18 BRPM | DIASTOLIC BLOOD PRESSURE: 71 MMHG | TEMPERATURE: 97.4 F | HEART RATE: 60 BPM

## 2023-12-03 NOTE — LACTATION NOTE
This note was copied from a baby's chart. In to follow up with mom and infant. Reviewed discharge information. Also measured mom's nipples left 20.4 mm and right 24.7 mm for determining correct flange size for pumping. Mom to follow up with lactation consultant as needed.

## 2023-12-03 NOTE — PROGRESS NOTES
In room for assessment, pt breastfeeding, pt to call RN to bedside when she is finished for assessment.   Pt in bed with call light in reach

## 2023-12-03 NOTE — LACTATION NOTE

## 2023-12-03 NOTE — PROGRESS NOTES
Patient discharged to home per MD orders. Discharge instructions reviewed with patient. Questions encouraged and answered. Patient verbalizes understanding. Patient escorted by [facility] staff to private vehicle. Pt declines w/c for d/c. Stable at discharge.

## 2023-12-03 NOTE — DISCHARGE SUMMARY
Patient ID:  Mariaa Woodward  [id number]  49 y.o.  1990    Admit Date: 2023    Discharge Date: 12/3/2023     Admitting Physician: Hernan Peres MD     Admission Diagnoses: Encounter for elective induction of labor [Z34.90]    Discharge Diagnoses: Same as above with   at   on   by   producing a viable  . Information for the patient's :  Stanley Martinez [[id number]]          Additional Diagnoses: none. No components found for: \"OBEXTABO/RH\", \"OBEXTABSCRN\", \"OBEXTHBSAG\", \"OBEXTHIV\", \"OBEXTRUBELLA\", \"OBEXTRPR\", \"OBEXTGONORR\", \"OBEXTCHLAM\", \"OBEXTGRBS\"    Significant Diagnostic Studies: none            History of Present Illness:   OB History          2    Para   1    Term   2       0    AB   0    Living   2         SAB        IAB        Ectopic        Molar        Multiple   0    Live Births   1              Admitted for IOL. Pregnancy complicated by no issues. [facility] Course:   Patient was admitted and underwent artificial rupture of membranes with Clear fluid noted. She underwent pitocin induction. Progressed to complete cervical dilatation with vaginal delivery. no Laceration. The remainder of the postpartum course was unremarkable. She was deemed stable for discharge home on day 2. Patient Instructions:   Current Discharge Medication List        CONTINUE these medications which have NOT CHANGED    Details   Prenatal Vit-Fe Fumarate-FA (PRENATAL PO) Take by mouth           STOP taking these medications       calcium carbonate (TUMS) 500 MG chewable tablet Comments:   Reason for Stopping:         docusate sodium (COLACE) 100 MG capsule Comments:   Reason for Stopping:         famotidine (PEPCID) 20 MG tablet Comments:   Reason for Stopping:             Call if vaginal bleeding greater than 1 pad per hour. No sex, douching or tampons for 6 weeks   Regular diet. See discharge instructions provided by nursing.     Follow-up in 6 weeks for postpartum

## 2023-12-03 NOTE — PROGRESS NOTES
Shift assessment complete see flowsheet. Discussed today plan of care with pt. Bleeding precautions given. No s/s of distress noted at this time. Questions encouraged and answered. Pt in bed with call light in reach. Pt to call with needs/concerns.

## 2023-12-03 NOTE — PROGRESS NOTES
12/03/23 0915   AVS Reviewed   AVS & discharge instructions reviewed with patient and/or representative?  Yes   Reviewed instructions with Patient   Level of Understanding Questions answered;Verbalized understanding